# Patient Record
Sex: MALE | Race: WHITE | Employment: FULL TIME | URBAN - METROPOLITAN AREA
[De-identification: names, ages, dates, MRNs, and addresses within clinical notes are randomized per-mention and may not be internally consistent; named-entity substitution may affect disease eponyms.]

---

## 2019-01-07 ENCOUNTER — OFFICE VISIT (OUTPATIENT)
Dept: PRIMARY CARE CLINIC | Age: 44
End: 2019-01-07
Payer: COMMERCIAL

## 2019-01-07 VITALS
HEART RATE: 100 BPM | DIASTOLIC BLOOD PRESSURE: 92 MMHG | WEIGHT: 234.6 LBS | SYSTOLIC BLOOD PRESSURE: 136 MMHG | OXYGEN SATURATION: 98 % | BODY MASS INDEX: 31.82 KG/M2

## 2019-01-07 DIAGNOSIS — F41.9 ANXIETY: Primary | ICD-10-CM

## 2019-01-07 DIAGNOSIS — F51.01 PRIMARY INSOMNIA: ICD-10-CM

## 2019-01-07 PROCEDURE — 99213 OFFICE O/P EST LOW 20 MIN: CPT | Performed by: FAMILY MEDICINE

## 2019-01-07 RX ORDER — MELATONIN 10 MG
10 CAPSULE ORAL NIGHTLY
COMMUNITY
Start: 2019-01-07 | End: 2021-01-25

## 2019-01-07 RX ORDER — CITALOPRAM 20 MG/1
20 TABLET ORAL DAILY
Qty: 30 TABLET | Refills: 1 | Status: SHIPPED | OUTPATIENT
Start: 2019-01-07 | End: 2019-02-11 | Stop reason: SDUPTHER

## 2019-02-11 ENCOUNTER — OFFICE VISIT (OUTPATIENT)
Dept: PRIMARY CARE CLINIC | Age: 44
End: 2019-02-11
Payer: COMMERCIAL

## 2019-02-11 VITALS
HEART RATE: 68 BPM | BODY MASS INDEX: 31.03 KG/M2 | WEIGHT: 228.8 LBS | SYSTOLIC BLOOD PRESSURE: 122 MMHG | OXYGEN SATURATION: 96 % | DIASTOLIC BLOOD PRESSURE: 80 MMHG

## 2019-02-11 DIAGNOSIS — Z79.899 MEDICATION MANAGEMENT: Primary | ICD-10-CM

## 2019-02-11 DIAGNOSIS — F41.9 ANXIETY: ICD-10-CM

## 2019-02-11 PROCEDURE — 99213 OFFICE O/P EST LOW 20 MIN: CPT | Performed by: FAMILY MEDICINE

## 2019-02-11 RX ORDER — CITALOPRAM 20 MG/1
20 TABLET ORAL DAILY
Qty: 30 TABLET | Refills: 5 | Status: SHIPPED | OUTPATIENT
Start: 2019-02-11 | End: 2019-08-30 | Stop reason: SDUPTHER

## 2019-02-11 ASSESSMENT — ENCOUNTER SYMPTOMS
NAUSEA: 1
VOMITING: 0
SHORTNESS OF BREATH: 0
CHEST TIGHTNESS: 0
SORE THROAT: 0
BLOOD IN STOOL: 0
DIARRHEA: 0
ABDOMINAL PAIN: 0
COUGH: 0
CONSTIPATION: 0

## 2019-02-11 ASSESSMENT — PATIENT HEALTH QUESTIONNAIRE - PHQ9
SUM OF ALL RESPONSES TO PHQ QUESTIONS 1-9: 0
2. FEELING DOWN, DEPRESSED OR HOPELESS: 0
1. LITTLE INTEREST OR PLEASURE IN DOING THINGS: 0
SUM OF ALL RESPONSES TO PHQ QUESTIONS 1-9: 0
SUM OF ALL RESPONSES TO PHQ9 QUESTIONS 1 & 2: 0

## 2019-08-30 DIAGNOSIS — F41.9 ANXIETY: ICD-10-CM

## 2019-09-03 RX ORDER — CITALOPRAM 20 MG/1
TABLET ORAL
Qty: 30 TABLET | Refills: 5 | Status: SHIPPED | OUTPATIENT
Start: 2019-09-03 | End: 2020-03-26

## 2020-04-28 RX ORDER — CITALOPRAM 20 MG/1
TABLET ORAL
Qty: 90 TABLET | Refills: 0 | OUTPATIENT
Start: 2020-04-28

## 2020-05-01 ENCOUNTER — TELEMEDICINE (OUTPATIENT)
Dept: PRIMARY CARE CLINIC | Age: 45
End: 2020-05-01
Payer: COMMERCIAL

## 2020-05-01 VITALS — HEIGHT: 72 IN | TEMPERATURE: 97.4 F | WEIGHT: 240 LBS | BODY MASS INDEX: 32.51 KG/M2

## 2020-05-01 PROCEDURE — 99213 OFFICE O/P EST LOW 20 MIN: CPT | Performed by: FAMILY MEDICINE

## 2020-05-01 RX ORDER — CITALOPRAM 20 MG/1
20 TABLET ORAL DAILY
Qty: 90 TABLET | Refills: 2 | Status: SHIPPED | OUTPATIENT
Start: 2020-05-01 | End: 2020-11-20 | Stop reason: SDUPTHER

## 2020-05-01 ASSESSMENT — PATIENT HEALTH QUESTIONNAIRE - PHQ9
1. LITTLE INTEREST OR PLEASURE IN DOING THINGS: 0
SUM OF ALL RESPONSES TO PHQ9 QUESTIONS 1 & 2: 0
SUM OF ALL RESPONSES TO PHQ QUESTIONS 1-9: 0
SUM OF ALL RESPONSES TO PHQ QUESTIONS 1-9: 0
2. FEELING DOWN, DEPRESSED OR HOPELESS: 0

## 2020-05-01 ASSESSMENT — ENCOUNTER SYMPTOMS: RESPIRATORY NEGATIVE: 1

## 2020-05-01 NOTE — PROGRESS NOTES
Lee Ann Desir a 53 y.o. male who presents today for his medical conditions/complaints as notedbelow. Chief Complaint   Patient presents with    Medication Check     PATIENT TAKES CELEXA, PATIENT SAID MEDICATION IS WORKING     Other     PT CONSENTS TO TELEHEALTH SERVICES          HPI:   HPI  Anxiety: under good control. Feels well  sometimes trouble sleeping with new job about 7 weeks  Uses melatonin 2 x a week. Had a work physical 7 weeks ago: BP was good. No results found for: LDLCHOLESTEROL, LDLCALC    (goal LDL is <100)   No results found for: AST, ALT, BUN  BP Readings from Last 3 Encounters:   02/11/19 122/80   01/07/19 (!) 136/92   06/21/18 (!) 134/100          (goal 120/80)    No past medical history on file. No past surgical history on file. No family history on file. Social History     Tobacco Use    Smoking status: Never Smoker    Smokeless tobacco: Never Used   Substance Use Topics    Alcohol use: Not on file      Current Outpatient Medications   Medication Sig Dispense Refill    citalopram (CELEXA) 20 MG tablet Take 1 tablet by mouth daily 90 tablet 2    Melatonin 10 MG CAPS Take 10 mg by mouth nightly (Patient taking differently: Take 10 mg by mouth nightly as needed )       No current facility-administered medications for this visit. Allergies   Allergen Reactions    Bactrim [Sulfamethoxazole-Trimethoprim] Itching and Swelling       Health Maintenance   Topic Date Due    HIV screen  05/25/1988    Lipid screen  05/25/2013    Diabetes screen  05/25/2013    Flu vaccine (Season Ended) 09/01/2020    DTaP/Tdap/Td vaccine (2 - Td) 03/22/2026    Hepatitis A vaccine  Aged Out    Hepatitis B vaccine  Aged Out    Hib vaccine  Aged Out    Meningococcal (ACWY) vaccine  Aged Out    Pneumococcal 0-64 years Vaccine  Aged Out       Subjective:      Review of Systems   Respiratory: Negative. Cardiovascular: Negative.     Psychiatric/Behavioral: Positive for sleep disturbance. The patient is not nervous/anxious. Objective:     Temp 97.4 °F (36.3 °C)   Ht 6' (1.829 m)   Wt 240 lb (108.9 kg)   BMI 32.55 kg/m²   Physical Exam  Vitals signs and nursing note reviewed. Constitutional:       General: He is not in acute distress. Appearance: Normal appearance. He is not ill-appearing. HENT:      Head: Normocephalic and atraumatic. Neurological:      Mental Status: He is alert and oriented to person, place, and time. Psychiatric:         Mood and Affect: Mood normal.         Behavior: Behavior normal.         Thought Content: Thought content normal.         Assessment:       Diagnosis Orders   1. Medication management     2. Anxiety  citalopram (CELEXA) 20 MG tablet   3. Screening cholesterol level  Lipid Panel   4. Diabetes mellitus screening  Glucose, Fasting        Plan:      Return in about 10 months (around 3/1/2021) for anxiety. Can use melatonin for sleep up to 10 mg every 2-3 days, can also try benadryl for sleep. Continue meds  Check cholesterol. Recheck on anxiety 8-12 months. Orders Placed This Encounter   Procedures    Lipid Panel     Standing Status:   Future     Standing Expiration Date:   5/1/2021     Order Specific Question:   Is Patient Fasting?/# of Hours     Answer:   yes    Glucose, Fasting     Standing Status:   Future     Standing Expiration Date:   11/1/2020     Orders Placed This Encounter   Medications    citalopram (CELEXA) 20 MG tablet     Sig: Take 1 tablet by mouth daily     Dispense:  90 tablet     Refill:  2       Patient given educational materials - see patient instructions. Discussed use, benefit, and side effects of prescribed medications. All patient questions answered. Pt voiced understanding. Reviewed health maintenance. Instructed to continue current medications. Patient agreed with treatment plan. Follow up as directed.      Steven Gonzalez is a 53 y.o. male being evaluated by a Virtual Visit (video visit)

## 2020-05-01 NOTE — PATIENT INSTRUCTIONS

## 2020-11-20 RX ORDER — CITALOPRAM 20 MG/1
20 TABLET ORAL DAILY
Qty: 90 TABLET | Refills: 2 | Status: SHIPPED | OUTPATIENT
Start: 2020-11-20 | End: 2021-01-25 | Stop reason: SDUPTHER

## 2021-01-25 ENCOUNTER — OFFICE VISIT (OUTPATIENT)
Dept: PRIMARY CARE CLINIC | Age: 46
End: 2021-01-25
Payer: COMMERCIAL

## 2021-01-25 VITALS
BODY MASS INDEX: 33.09 KG/M2 | OXYGEN SATURATION: 97 % | WEIGHT: 244 LBS | SYSTOLIC BLOOD PRESSURE: 118 MMHG | HEART RATE: 95 BPM | TEMPERATURE: 97.9 F | DIASTOLIC BLOOD PRESSURE: 78 MMHG

## 2021-01-25 DIAGNOSIS — F41.0 PANIC ATTACKS: ICD-10-CM

## 2021-01-25 DIAGNOSIS — F41.9 ANXIETY AND DEPRESSION: Primary | ICD-10-CM

## 2021-01-25 DIAGNOSIS — F32.A ANXIETY AND DEPRESSION: Primary | ICD-10-CM

## 2021-01-25 DIAGNOSIS — Z23 NEED FOR VACCINATION: ICD-10-CM

## 2021-01-25 DIAGNOSIS — Z13.31 POSITIVE DEPRESSION SCREENING: ICD-10-CM

## 2021-01-25 PROCEDURE — 90686 IIV4 VACC NO PRSV 0.5 ML IM: CPT | Performed by: PHYSICIAN ASSISTANT

## 2021-01-25 PROCEDURE — G8431 POS CLIN DEPRES SCRN F/U DOC: HCPCS | Performed by: PHYSICIAN ASSISTANT

## 2021-01-25 PROCEDURE — 90471 IMMUNIZATION ADMIN: CPT | Performed by: PHYSICIAN ASSISTANT

## 2021-01-25 PROCEDURE — 99213 OFFICE O/P EST LOW 20 MIN: CPT | Performed by: PHYSICIAN ASSISTANT

## 2021-01-25 RX ORDER — CITALOPRAM 40 MG/1
40 TABLET ORAL DAILY
Qty: 30 TABLET | Refills: 0 | Status: SHIPPED | OUTPATIENT
Start: 2021-01-25 | End: 2021-01-25 | Stop reason: SDUPTHER

## 2021-01-25 RX ORDER — CITALOPRAM 40 MG/1
40 TABLET ORAL DAILY
Qty: 90 TABLET | Refills: 0 | Status: SHIPPED | OUTPATIENT
Start: 2021-01-25 | End: 2021-05-03

## 2021-01-25 SDOH — ECONOMIC STABILITY: INCOME INSECURITY: HOW HARD IS IT FOR YOU TO PAY FOR THE VERY BASICS LIKE FOOD, HOUSING, MEDICAL CARE, AND HEATING?: NOT HARD AT ALL

## 2021-01-25 SDOH — ECONOMIC STABILITY: TRANSPORTATION INSECURITY
IN THE PAST 12 MONTHS, HAS THE LACK OF TRANSPORTATION KEPT YOU FROM MEDICAL APPOINTMENTS OR FROM GETTING MEDICATIONS?: NO

## 2021-01-25 SDOH — ECONOMIC STABILITY: TRANSPORTATION INSECURITY
IN THE PAST 12 MONTHS, HAS LACK OF TRANSPORTATION KEPT YOU FROM MEETINGS, WORK, OR FROM GETTING THINGS NEEDED FOR DAILY LIVING?: NO

## 2021-01-25 ASSESSMENT — PATIENT HEALTH QUESTIONNAIRE - PHQ9
4. FEELING TIRED OR HAVING LITTLE ENERGY: 2
5. POOR APPETITE OR OVEREATING: 2
SUM OF ALL RESPONSES TO PHQ QUESTIONS 1-9: 18
7. TROUBLE CONCENTRATING ON THINGS, SUCH AS READING THE NEWSPAPER OR WATCHING TELEVISION: 1
SUM OF ALL RESPONSES TO PHQ QUESTIONS 1-9: 18
SUM OF ALL RESPONSES TO PHQ9 QUESTIONS 1 & 2: 6
SUM OF ALL RESPONSES TO PHQ QUESTIONS 1-9: 18

## 2021-01-25 ASSESSMENT — COLUMBIA-SUICIDE SEVERITY RATING SCALE - C-SSRS: 2. HAVE YOU ACTUALLY HAD ANY THOUGHTS OF KILLING YOURSELF?: NO

## 2021-01-25 NOTE — PROGRESS NOTES
[facility]  [address] [facility] [address]  Dept: Grecia Kessler is a 50 y.o. male Established patient, who presents today for his medical conditions/complaintsas noted below. Chief Complaint   Patient presents with    Panic Attack     pt states it is triggered by work. HPI:     HPI     Anxiety and depression:  Worse the last 2.5 weeks. Works at [employer] as an . 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 His instructor is not kind and calls him an idiot. He can't stop thinking about the CDL training. He had a surprise birthday party for his son this week and couldn't enjoy it. He will have to retest for the CDL license at the end of Feb, so he cannot relax even though he is on vacation next week in [place]. Says he can't eat, can' t sleep. Melatonin only helps him sleep until 3 AM.  It is causing stomach pain that also builds up behind the sternum, but says she knows it is not cardiac CP because it resolved when he was venting to a friend who also has anxiety issues. Was getting panic attacks every couple days during the two weeks he was doing the CDL training and frequent crying spells. Says he was less anxious today because he got to do his normal job. C/o irritability, decreased concentration, less patience, feeling jumpy when his puppy barks. Reviewed prior notes None  Reviewed previous None    No results found for: LDLCHOLESTEROL, LDLCALC    (goal LDL is <100)   No results found for: AST, ALT, BUN, LABA1C, TSH  BP Readings from Last 3 Encounters:   01/25/21 118/78   02/11/19 122/80   01/07/19 (!) 136/92          (goal 120/80)    No past medical history on file. No past surgical history on file. No family history on file.     Social History     Tobacco Use    Smoking status: Never Smoker    Smokeless tobacco: Never Used Substance Use Topics    Alcohol use: Not on file      Current Outpatient Medications   Medication Sig Dispense Refill    citalopram (CELEXA) 40 MG tablet Take 1 tablet by mouth daily 90 tablet 0     No current facility-administered medications for this visit. Allergies   Allergen Reactions    Bactrim [Sulfamethoxazole-Trimethoprim] Itching and Swelling       Health Maintenance   Topic Date Due    Hepatitis C screen  1973    HIV screen  05/25/1988    Lipid screen  05/25/2013    Diabetes screen  05/25/2013    DTaP/Tdap/Td vaccine (2 - Td) 03/22/2026    Flu vaccine  Completed    Hepatitis A vaccine  Aged Out    Hepatitis B vaccine  Aged Out    Hib vaccine  Aged Out    Meningococcal (ACWY) vaccine  Aged Out    Pneumococcal 0-64 years Vaccine  Aged Out       Subjective:      Review of Systems   Gastrointestinal: Positive for abdominal pain and diarrhea (when anxious). Psychiatric/Behavioral: Positive for dysphoric mood and sleep disturbance. The patient is nervous/anxious. Objective:     /78   Pulse 95   Temp 97.9 °F (36.6 °C)   Wt 244 lb (110.7 kg)   SpO2 97%   BMI 33.09 kg/m²   Physical Exam  Vitals signs and nursing note reviewed. Constitutional:       Appearance: Normal appearance. HENT:      Head: Normocephalic and atraumatic. Cardiovascular:      Rate and Rhythm: Normal rate and regular rhythm. Pulmonary:      Effort: Pulmonary effort is normal.      Breath sounds: Normal breath sounds. Abdominal:      General: Bowel sounds are normal. There is no distension. Palpations: Abdomen is soft. Tenderness: There is abdominal tenderness in the epigastric area. There is no guarding or rebound. Neurological:      Mental Status: He is alert. Psychiatric:         Mood and Affect: Mood is anxious. Assessment:       Diagnosis Orders   1.  Anxiety and depression  External Referral To Psychology    citalopram (CELEXA) 40 MG tablet DISCONTINUED: citalopram (CELEXA) 40 MG tablet   2. Panic attacks  External Referral To Psychology    citalopram (CELEXA) 40 MG tablet    DISCONTINUED: citalopram (CELEXA) 40 MG tablet   3. Positive depression screening  Positive Screen for Clinical Depression with a Documented Follow-up Plan    4. Need for vaccination  INFLUENZA, QUADV, 3 YRS AND OLDER, IM PF, PREFILL SYR OR SDV, 0.5ML (AFLURIA QUADV, PF)        Plan:     1-3 Anxiety and depression (Chronic with exacerbation)   -Try increasing Celexa from 20 mg to 40 mg. If pt is still getting severe anxiety after increase, could consider adding buspar.   -Referral to psychology for counseling.   - I also suggested trying pepcid for his stomach pain, but pt said it is just his anxiety and declined a medication for it. 4. Given influenza vaccine. Reprinted previous orders for labs. Return in about 1 month (around 2/25/2021) for anxiety f/u with Dr. Teodoro Chapman.     Orders Placed This Encounter   Procedures    INFLUENZA, QUADV, 3 YRS AND OLDER, IM PF, PREFILL SYR OR SDV, 0.5ML (AFLURIA QUADV, PF)    External Referral To Psychology     Referral Priority:   Routine     Referral Type:   Eval and Treat     Referral Reason:   Specialty Services Required     Requested Specialty:   Psychology     Number of Visits Requested:   1    Positive Screen for Clinical Depression with a Documented Follow-up Plan      Orders Placed This Encounter   Medications    DISCONTD: citalopram (CELEXA) 40 MG tablet     Sig: Take 1 tablet by mouth daily     Dispense:  30 tablet     Refill:  0    citalopram (CELEXA) 40 MG tablet     Sig: Take 1 tablet by mouth daily     Dispense:  90 tablet     Refill:  0 Patient given educationalmaterials - see patient instructions. Discussed use, benefit, and side effectsof prescribed medications. All patient questions answered. Pt voiced understanding. Reviewed health maintenance. Instructed to continue current medications, diet andexercise. Patient agreed with treatment plan. Follow up as directed.      Electronicallysigned by Emily Narvaez PA-C on 1/27/2021 at 11:08 AM

## 2021-01-27 PROBLEM — F41.0 PANIC ATTACKS: Status: ACTIVE | Noted: 2021-01-27

## 2021-01-27 ASSESSMENT — ENCOUNTER SYMPTOMS
DIARRHEA: 1
ABDOMINAL PAIN: 1

## 2021-02-26 ENCOUNTER — OFFICE VISIT (OUTPATIENT)
Dept: PRIMARY CARE CLINIC | Age: 46
End: 2021-02-26
Payer: COMMERCIAL

## 2021-02-26 VITALS
HEART RATE: 81 BPM | SYSTOLIC BLOOD PRESSURE: 122 MMHG | HEIGHT: 72 IN | BODY MASS INDEX: 33.92 KG/M2 | WEIGHT: 250.4 LBS | DIASTOLIC BLOOD PRESSURE: 80 MMHG | TEMPERATURE: 97.9 F | OXYGEN SATURATION: 97 %

## 2021-02-26 DIAGNOSIS — F32.A ANXIETY AND DEPRESSION: ICD-10-CM

## 2021-02-26 DIAGNOSIS — F41.9 ANXIETY AND DEPRESSION: ICD-10-CM

## 2021-02-26 DIAGNOSIS — Z79.899 MEDICATION MANAGEMENT: Primary | ICD-10-CM

## 2021-02-26 DIAGNOSIS — F41.9 ANXIETY: ICD-10-CM

## 2021-02-26 PROCEDURE — 99213 OFFICE O/P EST LOW 20 MIN: CPT | Performed by: FAMILY MEDICINE

## 2021-02-26 NOTE — PATIENT INSTRUCTIONS
Patient Education        Anxiety Disorder: Care Instructions  Your Care Instructions     Anxiety is a normal reaction to stress. Difficult situations can cause you to have symptoms such as sweaty palms and a nervous feeling. In an anxiety disorder, the symptoms are far more severe. Constant worry, muscle tension, trouble sleeping, nausea and diarrhea, and other symptoms can make normal daily activities difficult or impossible. These symptoms may occur for no reason, and they can affect your work, school, or social life. Medicines, counseling, and self-care can all help. Follow-up care is a key part of your treatment and safety. Be sure to make and go to all appointments, and call your doctor if you are having problems. It's also a good idea to know your test results and keep a list of the medicines you take. How can you care for yourself at home? · Take medicines exactly as directed. Call your doctor if you think you are having a problem with your medicine. · Go to your counseling sessions and follow-up appointments. · Recognize and accept your anxiety. Then, when you are in a situation that makes you anxious, say to yourself, \"This is not an emergency. I feel uncomfortable, but I am not in danger. I can keep going even if I feel anxious. \"  · Be kind to your body:  ? Relieve tension with exercise or a massage. ? Get enough rest.  ? Avoid alcohol, caffeine, nicotine, and illegal drugs. They can increase your anxiety level and cause sleep problems. ? Learn and do relaxation techniques. See below for more about these techniques. · Engage your mind. Get out and do something you enjoy. Go to a funny movie, or take a walk or hike. Plan your day. Having too much or too little to do can make you anxious. · Keep a record of your symptoms. Discuss your fears with a good friend or family member, or join a support group for people with similar problems. Talking to others sometimes relieves stress. · Get involved in social groups, or volunteer to help others. Being alone sometimes makes things seem worse than they are. · Get at least 30 minutes of exercise on most days of the week to relieve stress. Walking is a good choice. You also may want to do other activities, such as running, swimming, cycling, or playing tennis or team sports. Relaxation techniques  Do relaxation exercises 10 to 20 minutes a day. You can play soothing, relaxing music while you do them, if you wish. · Tell others in your house that you are going to do your relaxation exercises. Ask them not to disturb you. · Find a comfortable place, away from all distractions and noise. · Lie down on your back, or sit with your back straight. · Focus on your breathing. Make it slow and steady. · Breathe in through your nose. Breathe out through either your nose or mouth. · Breathe deeply, filling up the area between your navel and your rib cage. Breathe so that your belly goes up and down. · Do not hold your breath. · Breathe like this for 5 to 10 minutes. Notice the feeling of calmness throughout your whole body. As you continue to breathe slowly and deeply, relax by doing the following for another 5 to 10 minutes:  · Tighten and relax each muscle group in your body. You can begin at your toes and work your way up to your head. · Imagine your muscle groups relaxing and becoming heavy. · Empty your mind of all thoughts. · Let yourself relax more and more deeply. · Become aware of the state of calmness that surrounds you. · When your relaxation time is over, you can bring yourself back to alertness by moving your fingers and toes and then your hands and feet and then stretching and moving your entire body. Sometimes people fall asleep during relaxation, but they usually wake up shortly afterward. · Always give yourself time to return to full alertness before you drive a car or do anything that might cause an accident if you are not fully alert. Never play a relaxation tape while you drive a car. When should you call for help? Call 911 anytime you think you may need emergency care. For example, call if:    · You feel you cannot stop from hurting yourself or someone else. Keep the numbers for these national suicide hotlines: 2-388-078-TALK (7-256.967.2546) and 0-013-IDUWSPQ (0-308.959.1516). If you or someone you know talks about suicide or feeling hopeless, get help right away. Watch closely for changes in your health, and be sure to contact your doctor if:    · You have anxiety or fear that affects your life.     · You have symptoms of anxiety that are new or different from those you had before. Where can you learn more? Go to https://[email or web address].Pursuit Management. org and sign in to your [employer] account. Enter P754 in the [employer] box to learn more about \"Anxiety Disorder: Care Instructions. \"     If you do not have an account, please click on the \"Sign Up Now\" link. Current as of: January 31, 2020               Content Version: 12.6  © 2006-2020 [employer]. Care instructions adapted under license by [facility] ([facility]). If you have questions about a medical condition or this instruction, always ask your healthcare professional. [place] [address] any warranty or liability for your use of this information. Patient Education        Learning About Anxiety Disorders  What are anxiety disorders? Anxiety disorders are a type of medical problem. They cause severe anxiety. When you feel anxious, you feel that something bad is about to happen. This feeling interferes with your life.   These disorders include: · Generalized anxiety disorder. You feel worried and stressed about many everyday events and activities. This goes on for several months and disrupts your life on most days. · Panic disorder. You have repeated panic attacks. A panic attack is a sudden, intense fear or anxiety. It may make you feel short of breath. Your heart may pound. · Social anxiety disorder. You feel very anxious about what you will say or do in front of people. For example, you may be scared to talk or eat in public. This problem affects your daily life. · Phobias. You are very scared of a specific object, situation, or activity. For example, you may fear spiders, high places, or small spaces. What are the symptoms? Generalized anxiety disorder  Symptoms may include:  · Feeling worried and stressed about many things almost every day. · Feeling tired or irritable. You may have a hard time concentrating. · Having headaches or muscle aches. · Having a hard time getting to sleep or staying asleep. Panic disorder  You may have repeated panic attacks when there is no reason for feeling afraid. You may change your daily activities because you worry that you will have another attack. Symptoms may include:  · Intense fear, terror, or anxiety. · Trouble breathing or very fast breathing. · Chest pain or tightness. · A heartbeat that races or is not regular. Social anxiety disorder  Symptoms may include:  · Fear about a social situation, such as eating in front of others or speaking in public. You may worry a lot. Or you may be afraid that something bad will happen. · Anxiety that can cause you to blush, sweat, and feel shaky. · A heartbeat that is faster than normal.  · A hard time focusing. Phobias  Symptoms may include:  · More fear than most people of being around an object, being in a situation, or doing an activity. You might also be stressed about the chance of being around the thing you fear. · Worry about losing control, panicking, fainting, or having physical symptoms like a faster heartbeat when you are around the situation or object. How are these disorders treated? Anxiety disorders can be treated with medicines or counseling. A combination of both may be used. Medicines may include:  · Antidepressants. These may help your symptoms by keeping chemicals in your brain in balance. · Benzodiazepines. These may give you short-term relief of your symptoms. Some people use cognitive-behavioral therapy. A therapist helps you learn to change stressful or bad thoughts into helpful thoughts. Lead a healthy lifestyle  A healthy lifestyle may help you feel better. · Get at least 30 minutes of exercise on most days of the week. Walking is a good choice. · Eat a healthy diet. Include fruits, vegetables, lean proteins, and whole grains in your diet each day. · Try to go to bed at the same time every night. Try for 8 hours of sleep a night. · Find ways to manage stress. Try relaxation exercises. · Avoid alcohol and illegal drugs. Follow-up care is a key part of your treatment and safety. Be sure to make and go to all appointments, and call your doctor if you are having problems. It's also a good idea to know your test results and keep a list of the medicines you take. Where can you learn more? Go to https://Thoundsbryceeb.Fiz. org and sign in to your [employer] account. Enter I393 in the [facility] box to learn more about \"Learning About Anxiety Disorders. \"     If you do not have an account, please click on the \"Sign Up Now\" link. Current as of: January 31, 2020               Content Version: 12.6  © 0901-3784 [employer]. Care instructions adapted under license by [facility] ([facility]). If you have questions about a medical condition or this instruction, always ask your healthcare professional. [address] any warranty or liability for your use of this information.

## 2021-02-26 NOTE — PROGRESS NOTES
[facility]  [address]  [facility] [address]  Dept: Grecia Kessler is a 50 y.o. male Established patient, who presents today for his medical conditions/complaintsas noted below. Chief Complaint   Patient presents with    Anxiety     1mth f/u       HPI:     HPI    Feeling more calm and in control  He is planning on seeing counselor  He is sleeping better  Not feeling upset. No chest pain, no palpitations. Nausea is gone    Reviewed prior notes None  Reviewed previous none    No results found for: LDLCHOLESTEROL, LDLCALC    (goal LDL is <100)   No results found for: AST, ALT, BUN, LABA1C, TSH  BP Readings from Last 3 Encounters:   02/26/21 122/80   01/25/21 118/78   02/11/19 122/80          (goal 120/80)    No past medical history on file. No past surgical history on file. No family history on file. Social History     Tobacco Use    Smoking status: Never Smoker    Smokeless tobacco: Never Used   Substance Use Topics    Alcohol use: Not on file      Current Outpatient Medications   Medication Sig Dispense Refill    citalopram (CELEXA) 40 MG tablet Take 1 tablet by mouth daily 90 tablet 0     No current facility-administered medications for this visit. Allergies   Allergen Reactions    Bactrim [Sulfamethoxazole-Trimethoprim] Itching and Swelling       Health Maintenance   Topic Date Due    Hepatitis C screen  1973    HIV screen  05/25/1988    Lipid screen  05/25/2013    Diabetes screen  05/25/2013    DTaP/Tdap/Td vaccine (2 - Td) 03/22/2026    Flu vaccine  Completed    Hepatitis A vaccine  Aged Out    Hepatitis B vaccine  Aged Out    Hib vaccine  Aged Out    Meningococcal (ACWY) vaccine  Aged Out    Pneumococcal 0-64 years Vaccine  Aged Out       Subjective:      Review of Systems   Constitutional: Negative.         Objective: /80   Pulse 81   Temp 97.9 °F (36.6 °C)   Ht 6' (1.829 m)   Wt 250 lb 6.4 oz (113.6 kg)   SpO2 97%   BMI 33.96 kg/m²   Physical Exam  Vitals signs and nursing note reviewed. Constitutional:       Appearance: Normal appearance. HENT:      Head: Normocephalic and atraumatic. Neurological:      Mental Status: He is alert and oriented to person, place, and time. Psychiatric:         Mood and Affect: Mood normal.         Behavior: Behavior normal.         Thought Content: Thought content normal.         Assessment:       Diagnosis Orders   1. Medication management     2. Anxiety     3. Anxiety and depression          Plan:      Return in about 4 months (around 6/26/2021) for anxiety. No orders of the defined types were placed in this encounter. No orders of the defined types were placed in this encounter. Patient given educationalmaterials - see patient instructions. Discussed use, benefit, and side effectsof prescribed medications. All patient questions answered. Pt voiced understanding. Reviewed health maintenance. Instructed to continue current medications, diet andexercise. Patient agreed with treatment plan. Follow up as directed.      Electronicallysigned by Cuco Jesus MD on 2/26/2021 at 2:05 PM

## 2021-03-01 RX ORDER — CITALOPRAM 40 MG/1
40 TABLET ORAL DAILY
Qty: 90 TABLET | Refills: 1 | Status: SHIPPED | OUTPATIENT
Start: 2021-03-01 | End: 2021-05-03 | Stop reason: SDUPTHER

## 2021-07-02 ENCOUNTER — OFFICE VISIT (OUTPATIENT)
Dept: PRIMARY CARE CLINIC | Age: 46
End: 2021-07-02
Payer: COMMERCIAL

## 2021-07-02 VITALS — SYSTOLIC BLOOD PRESSURE: 120 MMHG | HEART RATE: 60 BPM | DIASTOLIC BLOOD PRESSURE: 80 MMHG

## 2021-07-02 DIAGNOSIS — Z13.220 SCREENING CHOLESTEROL LEVEL: ICD-10-CM

## 2021-07-02 DIAGNOSIS — F41.9 ANXIETY AND DEPRESSION: ICD-10-CM

## 2021-07-02 DIAGNOSIS — Z13.1 DIABETES MELLITUS SCREENING: ICD-10-CM

## 2021-07-02 DIAGNOSIS — Z79.899 MEDICATION MANAGEMENT: Primary | ICD-10-CM

## 2021-07-02 DIAGNOSIS — F32.A ANXIETY AND DEPRESSION: ICD-10-CM

## 2021-07-02 PROCEDURE — 99213 OFFICE O/P EST LOW 20 MIN: CPT | Performed by: FAMILY MEDICINE

## 2021-07-02 NOTE — PROGRESS NOTES
[facility] PRIMARY CARE  [address]  [facility] [address]  Dept: Grecia Kessler is a 48 y.o. male Established patient, who presents today for his medical conditions/complaintsas noted below. Chief Complaint   Patient presents with    Follow-up     anxiety       HPI:     HPI    Reviewed prior notes None  Reviewed previous Labsnone  Anxiety is under control, he feels well. Having to take tests at work. No results found for: LDLCHOLESTEROL, LDLCALC    (goal LDL is <100)   No results found for: AST, ALT, BUN, LABA1C, TSH  BP Readings from Last 3 Encounters:   07/02/21 120/80   02/26/21 122/80   01/25/21 118/78          (goal 120/80)    No past medical history on file. No past surgical history on file. No family history on file. Social History     Tobacco Use    Smoking status: Never Smoker    Smokeless tobacco: Never Used   Substance Use Topics    Alcohol use: Not on file      Current Outpatient Medications   Medication Sig Dispense Refill    citalopram (CELEXA) 40 MG tablet TAKE 1 TABLET DAILY 90 tablet 1     No current facility-administered medications for this visit. Allergies   Allergen Reactions    Bactrim [Sulfamethoxazole-Trimethoprim] Itching and Swelling       Health Maintenance   Topic Date Due    Hepatitis C screen  Never done    HIV screen  Never done    Lipid screen  Never done    Diabetes screen  Never done    Flu vaccine (1) 09/01/2021    DTaP/Tdap/Td vaccine (2 - Td or Tdap) 03/22/2026    COVID-19 Vaccine  Completed    Hepatitis A vaccine  Aged Out    Hepatitis B vaccine  Aged Out    Hib vaccine  Aged Out    Meningococcal (ACWY) vaccine  Aged Out    Pneumococcal 0-64 years Vaccine  Aged Out       Subjective:      Review of Systems   Constitutional: Negative.     Genitourinary:        Bladder pressure issues off and on.        Objective:     /80 (Site: Left Upper Arm, Position: Sitting, Cuff Size: Large Adult)   Pulse 60   Physical Exam  Vitals and nursing note reviewed. Constitutional:       General: He is not in acute distress. Appearance: He is well-developed. He is not ill-appearing. HENT:      Head: Normocephalic and atraumatic. Eyes:      General: No scleral icterus. Right eye: No discharge. Left eye: No discharge. Neck:      Thyroid: No thyromegaly. Trachea: No tracheal deviation. Cardiovascular:      Rate and Rhythm: Regular rhythm. Lymphadenopathy:      Cervical: No cervical adenopathy. Skin:     General: Skin is warm. Findings: No rash. Neurological:      Mental Status: He is alert and oriented to person, place, and time. Psychiatric:         Mood and Affect: Mood normal.         Behavior: Behavior normal.         Thought Content: Thought content normal.         Assessment:       Diagnosis Orders   1. Medication management     2. Anxiety and depression     3. Screening cholesterol level  Lipid Panel   4. Diabetes mellitus screening  Glucose, Fasting        Plan:      Return in about 6 months (around 1/2/2022) for anxiety. Call prn. Orders Placed This Encounter   Procedures    Glucose, Fasting     Standing Status:   Future     Standing Expiration Date:   1/2/2022    Lipid Panel     Standing Status:   Future     Standing Expiration Date:   7/2/2022     Order Specific Question:   Is Patient Fasting?/# of Hours     Answer:   yes     No orders of the defined types were placed in this encounter. Patient given educationalmaterials - see patient instructions. Discussed use, benefit, and side effectsof prescribed medications. All patient questions answered. Pt voiced understanding. Reviewed health maintenance. Instructed to continue current medications, diet andexercise. Patient agreed with treatment plan. Follow up as directed.      Electronicallysigned by Placido Mendoza MD on 7/2/2021 at 11:17 AM

## 2021-07-09 ENCOUNTER — HOSPITAL ENCOUNTER (OUTPATIENT)
Age: 46
Setting detail: SPECIMEN
Discharge: HOME | End: 2021-07-09
Payer: COMMERCIAL

## 2021-07-09 DIAGNOSIS — Z13.1 DIABETES MELLITUS SCREENING: ICD-10-CM

## 2021-07-09 DIAGNOSIS — Z13.220 SCREENING CHOLESTEROL LEVEL: ICD-10-CM

## 2021-07-09 LAB
CHOLESTEROL/HDL RATIO: 5.9
CHOLESTEROL: 194 MG/DL
GLUCOSE FASTING: 87 MG/DL (ref 70–99)
HDLC SERPL-MCNC: 33 MG/DL
LDL CHOLESTEROL: 113 MG/DL (ref 0–130)
TRIGL SERPL-MCNC: 242 MG/DL
VLDLC SERPL CALC-MCNC: ABNORMAL MG/DL (ref 1–30)

## 2021-08-23 ENCOUNTER — HOSPITAL ENCOUNTER (EMERGENCY)
Age: 46
Discharge: HOME | End: 2021-08-23
Attending: EMERGENCY MEDICINE
Payer: COMMERCIAL

## 2021-08-23 ENCOUNTER — APPOINTMENT (OUTPATIENT)
Dept: CT IMAGING | Age: 46
End: 2021-08-23
Payer: COMMERCIAL

## 2021-08-23 VITALS
RESPIRATION RATE: 21 BRPM | DIASTOLIC BLOOD PRESSURE: 90 MMHG | HEIGHT: 72 IN | SYSTOLIC BLOOD PRESSURE: 132 MMHG | HEART RATE: 79 BPM | WEIGHT: 250 LBS | BODY MASS INDEX: 33.86 KG/M2 | TEMPERATURE: 97.5 F | OXYGEN SATURATION: 100 %

## 2021-08-23 DIAGNOSIS — N20.1 URETERAL CALCULUS, LEFT: Primary | ICD-10-CM

## 2021-08-23 LAB
-: ABNORMAL
ABSOLUTE EOS #: 0 K/UL (ref 0–0.4)
ABSOLUTE IMMATURE GRANULOCYTE: ABNORMAL K/UL (ref 0–0.3)
ABSOLUTE LYMPH #: 1.2 K/UL (ref 1–4.8)
ABSOLUTE MONO #: 1 K/UL (ref 0.1–1.3)
ALBUMIN SERPL-MCNC: 4.9 G/DL (ref 3.5–5.2)
ALBUMIN/GLOBULIN RATIO: ABNORMAL (ref 1–2.5)
ALP BLD-CCNC: 73 U/L (ref 40–129)
ALT SERPL-CCNC: 30 U/L (ref 5–41)
AMORPHOUS: ABNORMAL
ANION GAP SERPL CALCULATED.3IONS-SCNC: 13 MMOL/L (ref 9–17)
AST SERPL-CCNC: 23 U/L
BACTERIA: ABNORMAL
BASOPHILS # BLD: 1 % (ref 0–2)
BASOPHILS ABSOLUTE: 0.1 K/UL (ref 0–0.2)
BILIRUB SERPL-MCNC: 0.55 MG/DL (ref 0.3–1.2)
BILIRUBIN URINE: NEGATIVE
BUN BLDV-MCNC: 14 MG/DL (ref 6–20)
BUN/CREAT BLD: ABNORMAL (ref 9–20)
CALCIUM SERPL-MCNC: 10.2 MG/DL (ref 8.6–10.4)
CASTS UA: ABNORMAL /LPF
CHLORIDE BLD-SCNC: 105 MMOL/L (ref 98–107)
CO2: 25 MMOL/L (ref 20–31)
COLOR: YELLOW
COMMENT UA: ABNORMAL
CREAT SERPL-MCNC: 1.44 MG/DL (ref 0.7–1.2)
CRYSTALS, UA: ABNORMAL /HPF
DIFFERENTIAL TYPE: ABNORMAL
EOSINOPHILS RELATIVE PERCENT: 0 % (ref 0–4)
EPITHELIAL CELLS UA: ABNORMAL /HPF
GFR AFRICAN AMERICAN: >60 ML/MIN
GFR NON-AFRICAN AMERICAN: 52 ML/MIN
GFR SERPL CREATININE-BSD FRML MDRD: ABNORMAL ML/MIN/{1.73_M2}
GFR SERPL CREATININE-BSD FRML MDRD: ABNORMAL ML/MIN/{1.73_M2}
GLUCOSE BLD-MCNC: 109 MG/DL (ref 70–99)
GLUCOSE URINE: NEGATIVE
HCT VFR BLD CALC: 44.6 % (ref 41–53)
HEMOGLOBIN: 14.7 G/DL (ref 13.5–17.5)
IMMATURE GRANULOCYTES: ABNORMAL %
KETONES, URINE: ABNORMAL
LEUKOCYTE ESTERASE, URINE: NEGATIVE
LIPASE: 24 U/L (ref 13–60)
LYMPHOCYTES # BLD: 10 % (ref 24–44)
MCH RBC QN AUTO: 29.3 PG (ref 26–34)
MCHC RBC AUTO-ENTMCNC: 32.9 G/DL (ref 31–37)
MCV RBC AUTO: 89.2 FL (ref 80–100)
MONOCYTES # BLD: 8 % (ref 1–7)
MUCUS: ABNORMAL
NITRITE, URINE: NEGATIVE
NRBC AUTOMATED: ABNORMAL PER 100 WBC
OTHER OBSERVATIONS UA: ABNORMAL
PDW BLD-RTO: 13.9 % (ref 11.5–14.9)
PH UA: 6 (ref 5–8)
PLATELET # BLD: 258 K/UL (ref 150–450)
PLATELET ESTIMATE: ABNORMAL
PMV BLD AUTO: 6.7 FL (ref 6–12)
POTASSIUM SERPL-SCNC: 3.7 MMOL/L (ref 3.7–5.3)
PROTEIN UA: ABNORMAL
RBC # BLD: 5 M/UL (ref 4.5–5.9)
RBC # BLD: ABNORMAL 10*6/UL
RBC UA: ABNORMAL /HPF
RENAL EPITHELIAL, UA: ABNORMAL /HPF
SEG NEUTROPHILS: 81 % (ref 36–66)
SEGMENTED NEUTROPHILS ABSOLUTE COUNT: 10.1 K/UL (ref 1.3–9.1)
SODIUM BLD-SCNC: 143 MMOL/L (ref 135–144)
SPECIFIC GRAVITY UA: 1.02 (ref 1–1.03)
TOTAL PROTEIN: 7.5 G/DL (ref 6.4–8.3)
TRICHOMONAS: ABNORMAL
TURBIDITY: CLEAR
URINE HGB: ABNORMAL
UROBILINOGEN, URINE: NORMAL
WBC # BLD: 12.4 K/UL (ref 3.5–11)
WBC # BLD: ABNORMAL 10*3/UL
WBC UA: ABNORMAL /HPF
YEAST: ABNORMAL

## 2021-08-23 PROCEDURE — 74176 CT ABD & PELVIS W/O CONTRAST: CPT

## 2021-08-23 PROCEDURE — 81001 URINALYSIS AUTO W/SCOPE: CPT

## 2021-08-23 PROCEDURE — 83690 ASSAY OF LIPASE: CPT

## 2021-08-23 PROCEDURE — 6370000000 HC RX 637 (ALT 250 FOR IP): Performed by: EMERGENCY MEDICINE

## 2021-08-23 PROCEDURE — 85025 COMPLETE CBC W/AUTO DIFF WBC: CPT

## 2021-08-23 PROCEDURE — 80053 COMPREHEN METABOLIC PANEL: CPT

## 2021-08-23 PROCEDURE — 36415 COLL VENOUS BLD VENIPUNCTURE: CPT

## 2021-08-23 PROCEDURE — 99284 EMERGENCY DEPT VISIT MOD MDM: CPT

## 2021-08-23 RX ORDER — HYDROCODONE BITARTRATE AND ACETAMINOPHEN 5; 325 MG/1; MG/1
1 TABLET ORAL EVERY 6 HOURS PRN
Qty: 20 TABLET | Refills: 0 | Status: ON HOLD | OUTPATIENT
Start: 2021-08-23 | End: 2021-08-27 | Stop reason: SDUPTHER

## 2021-08-23 RX ORDER — IBUPROFEN 800 MG/1
800 TABLET ORAL EVERY 8 HOURS PRN
Qty: 30 TABLET | Refills: 0 | Status: SHIPPED | OUTPATIENT
Start: 2021-08-23 | End: 2022-01-14 | Stop reason: ALTCHOICE

## 2021-08-23 RX ORDER — TAMSULOSIN HYDROCHLORIDE 0.4 MG/1
0.4 CAPSULE ORAL DAILY
Qty: 7 CAPSULE | Refills: 0 | Status: ON HOLD | OUTPATIENT
Start: 2021-08-23 | End: 2021-08-27 | Stop reason: SDUPTHER

## 2021-08-23 RX ORDER — CEPHALEXIN 250 MG/1
500 CAPSULE ORAL ONCE
Status: COMPLETED | OUTPATIENT
Start: 2021-08-23 | End: 2021-08-23

## 2021-08-23 RX ORDER — TAMSULOSIN HYDROCHLORIDE 0.4 MG/1
0.4 CAPSULE ORAL ONCE
Status: COMPLETED | OUTPATIENT
Start: 2021-08-23 | End: 2021-08-23

## 2021-08-23 RX ORDER — CEPHALEXIN 500 MG/1
500 CAPSULE ORAL 4 TIMES DAILY
Qty: 28 CAPSULE | Refills: 0 | Status: ON HOLD | OUTPATIENT
Start: 2021-08-23 | End: 2021-08-27 | Stop reason: SDUPTHER

## 2021-08-23 RX ADMIN — CEPHALEXIN 500 MG: 250 CAPSULE ORAL at 16:36

## 2021-08-23 RX ADMIN — TAMSULOSIN HYDROCHLORIDE 0.4 MG: 0.4 CAPSULE ORAL at 16:36

## 2021-08-23 ASSESSMENT — ENCOUNTER SYMPTOMS
BACK PAIN: 0
TROUBLE SWALLOWING: 0
SHORTNESS OF BREATH: 0
COLOR CHANGE: 0
NAUSEA: 0
VOMITING: 0
ABDOMINAL PAIN: 0
DIARRHEA: 0
COUGH: 0
CONSTIPATION: 0
SORE THROAT: 0
BLOOD IN STOOL: 0

## 2021-08-23 ASSESSMENT — PAIN DESCRIPTION - LOCATION: LOCATION: FLANK

## 2021-08-23 ASSESSMENT — PAIN SCALES - GENERAL: PAINLEVEL_OUTOF10: 9

## 2021-08-23 ASSESSMENT — PAIN DESCRIPTION - ORIENTATION: ORIENTATION: LEFT

## 2021-08-23 ASSESSMENT — PAIN DESCRIPTION - PAIN TYPE: TYPE: ACUTE PAIN

## 2021-08-23 NOTE — ED TRIAGE NOTES
Mode of arrival (squad #, walk in, police, etc) : Walk in        Chief complaint(s): Flank pain        Arrival Note (brief scenario, treatment PTA, etc). : Pt arrives to ED c/o left sided flank pain that he states has been ongoing for the last hour. Patient states that he had a similar pain once or twice earlier this month but that it went away. Patient denies any history of kidney stones. C= \"Have you ever felt that you should Cut down on your drinking? \"  No  A= \"Have people Annoyed you by criticizing your drinking? \"  No  G= \"Have you ever felt bad or Guilty about your drinking? \"  No  E= \"Have you ever had a drink as an Eye-opener first thing in the morning to steady your nerves or to help a hangover? \"  No      Deferred []      Reason for deferring: N/A    *If yes to two or more: probable alcohol abuse. *

## 2021-08-23 NOTE — ED PROVIDER NOTES
[address]  EMERGENCY DEPARTMENT ENCOUNTER    Pt Name: Shayan Ellison  MRN: [id number]  YOB: 1975  Date of evaluation:8/23/21  PCP: Verna Rinne, MD    CHIEF COMPLAINT       Chief Complaint   Patient presents with    Flank Pain       HISTORY OF PRESENT ILLNESS    Shayan Ellison is a 48 y.o. male who presents with a chief complaint of left leg pain. Patient states for the past several hours he has had intermittent pain in his left flank that radiates around his left side. States he has had 2 episodes of this in the past but it went away spontaneously. Has never been diagnosed with kidney stones. No nausea or vomiting. No changes in bowel or bladder habits. No dysuria, hematuria. Symptoms are acute. Symptoms are moderate. He describes pain as sharp. Nothing makes it better or worse. Patient has no other complaints at this time. REVIEW OF SYSTEMS       Review of Systems   Constitutional: Negative for chills, fatigue and fever. HENT: Negative for congestion, ear pain, sore throat and trouble swallowing. Eyes: Negative for visual disturbance. Respiratory: Negative for cough and shortness of breath. Cardiovascular: Negative for chest pain, palpitations and leg swelling. Gastrointestinal: Negative for abdominal pain, blood in stool, constipation, diarrhea, nausea and vomiting. Genitourinary: Positive for flank pain. Negative for dysuria. Musculoskeletal: Negative for arthralgias, back pain, myalgias and neck pain. Skin: Negative for color change, rash and wound. Neurological: Negative for dizziness, weakness, light-headedness, numbness and headaches. Psychiatric/Behavioral: Negative for confusion. All other systems reviewed and are negative. Negative in 10 essential Systems except as mentioned above and in the HPI. PAST MEDICAL HISTORY   History reviewed. No pertinent past medical history.   None    SURGICAL HISTORY      has no past surgical history on file.  None    CURRENT MEDICATIONS       Previous Medications    CITALOPRAM (CELEXA) 40 MG TABLET    TAKE 1 TABLET DAILY       ALLERGIES     is allergic to bactrim [sulfamethoxazole-trimethoprim]. FAMILY HISTORY     has no family status information on file. family history is not on file. SOCIAL HISTORY      reports that he has never smoked. He has never used smokeless tobacco.    PHYSICAL EXAM     INITIAL VITALS:  height is 6' (1.829 m) and weight is 250 lb (113.4 kg). His oral temperature is 97.5 °F (36.4 °C). His blood pressure is 132/90 (abnormal) and his pulse is 79. His respiration is 21 and oxygen saturation is 100%. Physical Exam  Vitals and nursing note reviewed. Constitutional:       General: He is not in acute distress. HENT:      Head: Normocephalic and atraumatic. Eyes:      Conjunctiva/sclera: Conjunctivae normal.      Pupils: Pupils are equal, round, and reactive to light. Cardiovascular:      Rate and Rhythm: Normal rate and regular rhythm. Heart sounds: Normal heart sounds. No murmur heard. No friction rub. No gallop. Pulmonary:      Effort: Pulmonary effort is normal. No respiratory distress. Breath sounds: Normal breath sounds. Abdominal:      General: Bowel sounds are normal. There is no distension. Palpations: Abdomen is soft. Tenderness: There is no abdominal tenderness. There is no right CVA tenderness or left CVA tenderness. Musculoskeletal:         General: No tenderness. Cervical back: Neck supple. Lymphadenopathy:      Cervical: No cervical adenopathy. Skin:     General: Skin is warm and dry. Findings: No rash. Neurological:      Mental Status: He is alert and oriented to person, place, and time. Psychiatric:         Judgment: Judgment normal.           DIFFERENTIAL DIAGNOSIS/MDM:   25-year-old male presents with left flank pain for the past several hours. He is afebrile, nontoxic, normal vital signs.   No acute distress. No abdominal tenderness on my exam.  No CVA tenderness however he states that the pain is really not present currently. With CT abdomen pelvis, urinalysis, blood work. Differential includes kidney stone, UTI, less likely pancreatitis, gastritis. DIAGNOSTIC RESULTS     EKG: All EKG's are interpreted by the Emergency Department Physician who either signs or Co-signs this chart in the absence of a cardiologist.        RADIOLOGY:   I directly visualized the following  images and reviewed the radiologist interpretations:  CT ABDOMEN PELVIS WO CONTRAST Additional Contrast? None   Final Result   1. There is a 5 x 5 x 10 mm obstructing calculus in the mid left ureter   causing mild hydronephrosis and proximal hydroureter. ED BEDSIDE ULTRASOUND:      LABS:  Labs Reviewed   URINE RT REFLEX TO CULTURE - Abnormal; Notable for the following components:       Result Value    Ketones, Urine TRACE (*)     Urine Hgb SMALL (*)     Protein, UA 1+ (*)     All other components within normal limits   MICROSCOPIC URINALYSIS - Abnormal; Notable for the following components:    Bacteria, UA FEW (*)     All other components within normal limits   CBC WITH AUTO DIFFERENTIAL - Abnormal; Notable for the following components:    WBC 12.4 (*)     Seg Neutrophils 81 (*)     Lymphocytes 10 (*)     Monocytes 8 (*)     Segs Absolute 10.10 (*)     All other components within normal limits   COMPREHENSIVE METABOLIC PANEL - Abnormal; Notable for the following components:    Glucose 109 (*)     CREATININE 1.44 (*)     GFR Non- 52 (*)     All other components within normal limits   LIPASE         EMERGENCY DEPARTMENT COURSE:   Vitals:    Vitals:    08/23/21 1324   BP: (!) 132/90   Pulse: 79   Resp: 21   Temp: 97.5 °F (36.4 °C)   TempSrc: Oral   SpO2: 100%   Weight: 250 lb (113.4 kg)   Height: 6' (1.829 m)     Patient found to have a 1 cm kidney stone in the left ureter.     He has a very mildly elevated white count. Creatinine is very mildly elevated at 1.44. I spoke with Dr. Radha Wynn urology and discussed case. Since patient is pain-free at this time he is recommending home with pain medication, Flomax, antibiotics. He most likely will need to do procedure for the stone but this can be done as an outpatient later this week. Patient updated on plan he is agreeable to this. He will be discharged home today. CRITICALCARE:      CONSULTS:  IP CONSULT TO UROLOGY      PROCEDURES:      FINAL IMPRESSION      1. Ureteral calculus, left            DISPOSITION/PLAN   DISPOSITION Decision To Discharge 08/23/2021 04:37:12 PM        PATIENT REFERRED TO:  Cain Curling, MD  [phone] [facility]  [phone]    Schedule an appointment as soon as possible for a visit       [facility] ED  [place] [address]  [phone]    As needed, If symptoms worsen    Eloisa Whitfield MD  [place] [address] [address] [place] [address]  [phone]    Schedule an appointment as soon as possible for a visit         DISCHARGE MEDICATIONS:  New Prescriptions    CEPHALEXIN (KEFLEX) 500 MG CAPSULE    Take 1 capsule by mouth 4 times daily for 7 days    HYDROCODONE-ACETAMINOPHEN (NORCO) 5-325 MG PER TABLET    Take 1 tablet by mouth every 6 hours as needed for Pain for up to 7 days.     IBUPROFEN (ADVIL;MOTRIN) 800 MG TABLET    Take 1 tablet by mouth every 8 hours as needed for Pain    TAMSULOSIN (FLOMAX) 0.4 MG CAPSULE    Take 1 capsule by mouth daily for 7 doses       (Please note that portions ofthis note were completed with a voice recognition program.  Efforts were made to edit the dictations but occasionally words are mis-transcribed.)    Cornell Quarles DO  Attending Emergency Physician          Cornell Quarles DO  08/23/21 8950

## 2021-08-23 NOTE — ED NOTES
Intermittent flank pain left sided for 2-3 months. Had pain 10 out of 10 today.       Pernell Saeed RN  08/23/21 3701

## 2021-08-27 ENCOUNTER — APPOINTMENT (OUTPATIENT)
Dept: GENERAL RADIOLOGY | Age: 46
End: 2021-08-27
Attending: UROLOGY
Payer: COMMERCIAL

## 2021-08-27 ENCOUNTER — ANESTHESIA EVENT (OUTPATIENT)
Dept: OPERATING ROOM | Age: 46
End: 2021-08-27
Payer: COMMERCIAL

## 2021-08-27 ENCOUNTER — ANESTHESIA (OUTPATIENT)
Dept: OPERATING ROOM | Age: 46
End: 2021-08-27
Payer: COMMERCIAL

## 2021-08-27 ENCOUNTER — HOSPITAL ENCOUNTER (OUTPATIENT)
Age: 46
Setting detail: OUTPATIENT SURGERY
Discharge: HOME | End: 2021-08-27
Attending: UROLOGY | Admitting: UROLOGY
Payer: COMMERCIAL

## 2021-08-27 VITALS — TEMPERATURE: 96.7 F | OXYGEN SATURATION: 100 % | DIASTOLIC BLOOD PRESSURE: 68 MMHG | SYSTOLIC BLOOD PRESSURE: 108 MMHG

## 2021-08-27 VITALS
RESPIRATION RATE: 16 BRPM | BODY MASS INDEX: 34.64 KG/M2 | DIASTOLIC BLOOD PRESSURE: 92 MMHG | WEIGHT: 255.73 LBS | HEART RATE: 62 BPM | TEMPERATURE: 96.8 F | OXYGEN SATURATION: 99 % | HEIGHT: 72 IN | SYSTOLIC BLOOD PRESSURE: 147 MMHG

## 2021-08-27 DIAGNOSIS — N20.1 URETERAL CALCULUS, LEFT: ICD-10-CM

## 2021-08-27 PROCEDURE — 3600000004 HC SURGERY LEVEL 4 BASE: Performed by: UROLOGY

## 2021-08-27 PROCEDURE — 2580000003 HC RX 258: Performed by: ANESTHESIOLOGY

## 2021-08-27 PROCEDURE — C1769 GUIDE WIRE: HCPCS | Performed by: UROLOGY

## 2021-08-27 PROCEDURE — 7100000011 HC PHASE II RECOVERY - ADDTL 15 MIN: Performed by: UROLOGY

## 2021-08-27 PROCEDURE — 7100000001 HC PACU RECOVERY - ADDTL 15 MIN: Performed by: UROLOGY

## 2021-08-27 PROCEDURE — 3600000014 HC SURGERY LEVEL 4 ADDTL 15MIN: Performed by: UROLOGY

## 2021-08-27 PROCEDURE — 3209999900 FLUORO FOR SURGICAL PROCEDURES

## 2021-08-27 PROCEDURE — 6360000002 HC RX W HCPCS

## 2021-08-27 PROCEDURE — 2580000003 HC RX 258: Performed by: UROLOGY

## 2021-08-27 PROCEDURE — 7100000010 HC PHASE II RECOVERY - FIRST 15 MIN: Performed by: UROLOGY

## 2021-08-27 PROCEDURE — C1758 CATHETER, URETERAL: HCPCS | Performed by: UROLOGY

## 2021-08-27 PROCEDURE — 7100000000 HC PACU RECOVERY - FIRST 15 MIN: Performed by: UROLOGY

## 2021-08-27 PROCEDURE — 2720000010 HC SURG SUPPLY STERILE: Performed by: UROLOGY

## 2021-08-27 PROCEDURE — C2617 STENT, NON-COR, TEM W/O DEL: HCPCS | Performed by: UROLOGY

## 2021-08-27 PROCEDURE — 2500000003 HC RX 250 WO HCPCS

## 2021-08-27 PROCEDURE — 3700000001 HC ADD 15 MINUTES (ANESTHESIA): Performed by: UROLOGY

## 2021-08-27 PROCEDURE — 2709999900 HC NON-CHARGEABLE SUPPLY: Performed by: UROLOGY

## 2021-08-27 PROCEDURE — 6370000000 HC RX 637 (ALT 250 FOR IP): Performed by: ANESTHESIOLOGY

## 2021-08-27 PROCEDURE — 3700000000 HC ANESTHESIA ATTENDED CARE: Performed by: UROLOGY

## 2021-08-27 DEVICE — URETERAL STENT
Type: IMPLANTABLE DEVICE | Site: URETER | Status: FUNCTIONAL
Brand: POLARIS™ ULTRA

## 2021-08-27 RX ORDER — MIDAZOLAM HYDROCHLORIDE 2 MG/2ML
1 INJECTION, SOLUTION INTRAMUSCULAR; INTRAVENOUS EVERY 10 MIN PRN
Status: DISCONTINUED | OUTPATIENT
Start: 2021-08-27 | End: 2021-08-27 | Stop reason: HOSPADM

## 2021-08-27 RX ORDER — FENTANYL CITRATE 50 UG/ML
INJECTION, SOLUTION INTRAMUSCULAR; INTRAVENOUS PRN
Status: DISCONTINUED | OUTPATIENT
Start: 2021-08-27 | End: 2021-08-27 | Stop reason: SDUPTHER

## 2021-08-27 RX ORDER — MIDAZOLAM HYDROCHLORIDE 1 MG/ML
INJECTION INTRAMUSCULAR; INTRAVENOUS PRN
Status: DISCONTINUED | OUTPATIENT
Start: 2021-08-27 | End: 2021-08-27 | Stop reason: SDUPTHER

## 2021-08-27 RX ORDER — TAMSULOSIN HYDROCHLORIDE 0.4 MG/1
0.4 CAPSULE ORAL DAILY
Qty: 14 CAPSULE | Refills: 0 | Status: SHIPPED | OUTPATIENT
Start: 2021-08-27 | End: 2022-01-14 | Stop reason: ALTCHOICE

## 2021-08-27 RX ORDER — ONDANSETRON 2 MG/ML
4 INJECTION INTRAMUSCULAR; INTRAVENOUS
Status: DISCONTINUED | OUTPATIENT
Start: 2021-08-27 | End: 2021-08-27 | Stop reason: HOSPADM

## 2021-08-27 RX ORDER — SODIUM CHLORIDE, SODIUM LACTATE, POTASSIUM CHLORIDE, CALCIUM CHLORIDE 600; 310; 30; 20 MG/100ML; MG/100ML; MG/100ML; MG/100ML
INJECTION, SOLUTION INTRAVENOUS CONTINUOUS
Status: DISCONTINUED | OUTPATIENT
Start: 2021-08-27 | End: 2021-08-27 | Stop reason: HOSPADM

## 2021-08-27 RX ORDER — LIDOCAINE HYDROCHLORIDE 10 MG/ML
INJECTION, SOLUTION EPIDURAL; INFILTRATION; INTRACAUDAL; PERINEURAL PRN
Status: DISCONTINUED | OUTPATIENT
Start: 2021-08-27 | End: 2021-08-27 | Stop reason: SDUPTHER

## 2021-08-27 RX ORDER — ONDANSETRON 2 MG/ML
INJECTION INTRAMUSCULAR; INTRAVENOUS PRN
Status: DISCONTINUED | OUTPATIENT
Start: 2021-08-27 | End: 2021-08-27 | Stop reason: SDUPTHER

## 2021-08-27 RX ORDER — FENTANYL CITRATE 50 UG/ML
25 INJECTION, SOLUTION INTRAMUSCULAR; INTRAVENOUS EVERY 5 MIN PRN
Status: DISCONTINUED | OUTPATIENT
Start: 2021-08-27 | End: 2021-08-27 | Stop reason: HOSPADM

## 2021-08-27 RX ORDER — CEPHALEXIN 500 MG/1
500 CAPSULE ORAL 4 TIMES DAILY
Qty: 12 CAPSULE | Refills: 0 | Status: SHIPPED | OUTPATIENT
Start: 2021-08-27 | End: 2021-08-30

## 2021-08-27 RX ORDER — FENTANYL CITRATE 50 UG/ML
50 INJECTION, SOLUTION INTRAMUSCULAR; INTRAVENOUS EVERY 5 MIN PRN
Status: DISCONTINUED | OUTPATIENT
Start: 2021-08-27 | End: 2021-08-27 | Stop reason: HOSPADM

## 2021-08-27 RX ORDER — HYDROCODONE BITARTRATE AND ACETAMINOPHEN 5; 325 MG/1; MG/1
1 TABLET ORAL
Status: COMPLETED | OUTPATIENT
Start: 2021-08-27 | End: 2021-08-27

## 2021-08-27 RX ORDER — MEPERIDINE HYDROCHLORIDE 50 MG/ML
12.5 INJECTION INTRAMUSCULAR; INTRAVENOUS; SUBCUTANEOUS EVERY 5 MIN PRN
Status: DISCONTINUED | OUTPATIENT
Start: 2021-08-27 | End: 2021-08-27 | Stop reason: HOSPADM

## 2021-08-27 RX ORDER — DEXAMETHASONE SODIUM PHOSPHATE 10 MG/ML
INJECTION INTRAMUSCULAR; INTRAVENOUS PRN
Status: DISCONTINUED | OUTPATIENT
Start: 2021-08-27 | End: 2021-08-27 | Stop reason: SDUPTHER

## 2021-08-27 RX ORDER — MAGNESIUM HYDROXIDE 1200 MG/15ML
LIQUID ORAL PRN
Status: DISCONTINUED | OUTPATIENT
Start: 2021-08-27 | End: 2021-08-27 | Stop reason: ALTCHOICE

## 2021-08-27 RX ORDER — MAGNESIUM HYDROXIDE 1200 MG/15ML
LIQUID ORAL CONTINUOUS PRN
Status: COMPLETED | OUTPATIENT
Start: 2021-08-27 | End: 2021-08-27

## 2021-08-27 RX ORDER — PROPOFOL 10 MG/ML
INJECTION, EMULSION INTRAVENOUS PRN
Status: DISCONTINUED | OUTPATIENT
Start: 2021-08-27 | End: 2021-08-27 | Stop reason: SDUPTHER

## 2021-08-27 RX ORDER — CEFAZOLIN SODIUM 2 G/50ML
SOLUTION INTRAVENOUS PRN
Status: DISCONTINUED | OUTPATIENT
Start: 2021-08-27 | End: 2021-08-27 | Stop reason: SDUPTHER

## 2021-08-27 RX ORDER — HYDROCODONE BITARTRATE AND ACETAMINOPHEN 5; 325 MG/1; MG/1
1 TABLET ORAL EVERY 6 HOURS PRN
Qty: 15 TABLET | Refills: 0 | Status: SHIPPED | OUTPATIENT
Start: 2021-08-27 | End: 2021-09-01

## 2021-08-27 RX ADMIN — ONDANSETRON 4 MG: 2 INJECTION, SOLUTION INTRAMUSCULAR; INTRAVENOUS at 12:45

## 2021-08-27 RX ADMIN — LIDOCAINE HYDROCHLORIDE 50 MG: 10 INJECTION, SOLUTION EPIDURAL; INFILTRATION; INTRACAUDAL; PERINEURAL at 12:02

## 2021-08-27 RX ADMIN — CEFAZOLIN SODIUM 2000 MG: 2 SOLUTION INTRAVENOUS at 12:09

## 2021-08-27 RX ADMIN — FENTANYL CITRATE 50 MCG: 50 INJECTION, SOLUTION INTRAMUSCULAR; INTRAVENOUS at 12:08

## 2021-08-27 RX ADMIN — HYDROCODONE BITARTRATE AND ACETAMINOPHEN 1 TABLET: 5; 325 TABLET ORAL at 13:50

## 2021-08-27 RX ADMIN — FENTANYL CITRATE 50 MCG: 50 INJECTION, SOLUTION INTRAMUSCULAR; INTRAVENOUS at 12:00

## 2021-08-27 RX ADMIN — PROPOFOL 200 MG: 10 INJECTION, EMULSION INTRAVENOUS at 12:02

## 2021-08-27 RX ADMIN — PROPOFOL 200 MG: 10 INJECTION, EMULSION INTRAVENOUS at 12:04

## 2021-08-27 RX ADMIN — SODIUM CHLORIDE, POTASSIUM CHLORIDE, SODIUM LACTATE AND CALCIUM CHLORIDE: 600; 310; 30; 20 INJECTION, SOLUTION INTRAVENOUS at 10:41

## 2021-08-27 RX ADMIN — MIDAZOLAM HYDROCHLORIDE 2 MG: 1 INJECTION, SOLUTION INTRAMUSCULAR; INTRAVENOUS at 12:00

## 2021-08-27 RX ADMIN — DEXAMETHASONE SODIUM PHOSPHATE 10 MG: 10 INJECTION INTRAMUSCULAR; INTRAVENOUS at 12:15

## 2021-08-27 ASSESSMENT — PULMONARY FUNCTION TESTS
PIF_VALUE: 1
PIF_VALUE: 14
PIF_VALUE: 13
PIF_VALUE: 14
PIF_VALUE: 16
PIF_VALUE: 1
PIF_VALUE: 9
PIF_VALUE: 14
PIF_VALUE: 12
PIF_VALUE: 2
PIF_VALUE: 14
PIF_VALUE: 14
PIF_VALUE: 0
PIF_VALUE: 21
PIF_VALUE: 8
PIF_VALUE: 15
PIF_VALUE: 13
PIF_VALUE: 2
PIF_VALUE: 14
PIF_VALUE: 14
PIF_VALUE: 15
PIF_VALUE: 16
PIF_VALUE: 0
PIF_VALUE: 10
PIF_VALUE: 1
PIF_VALUE: 14
PIF_VALUE: 15
PIF_VALUE: 14
PIF_VALUE: 14
PIF_VALUE: 13
PIF_VALUE: 15
PIF_VALUE: 17
PIF_VALUE: 14
PIF_VALUE: 14
PIF_VALUE: 15
PIF_VALUE: 16
PIF_VALUE: 3
PIF_VALUE: 20
PIF_VALUE: 0
PIF_VALUE: 0
PIF_VALUE: 13
PIF_VALUE: 15
PIF_VALUE: 0
PIF_VALUE: 15
PIF_VALUE: 14
PIF_VALUE: 8
PIF_VALUE: 14
PIF_VALUE: 0
PIF_VALUE: 4
PIF_VALUE: 15
PIF_VALUE: 0
PIF_VALUE: 16
PIF_VALUE: 24
PIF_VALUE: 14
PIF_VALUE: 1
PIF_VALUE: 15

## 2021-08-27 ASSESSMENT — PAIN SCALES - GENERAL
PAINLEVEL_OUTOF10: 5
PAINLEVEL_OUTOF10: 1
PAINLEVEL_OUTOF10: 1

## 2021-08-27 ASSESSMENT — PAIN DESCRIPTION - PAIN TYPE: TYPE: SURGICAL PAIN

## 2021-08-27 ASSESSMENT — PAIN DESCRIPTION - LOCATION: LOCATION: PENIS

## 2021-08-27 ASSESSMENT — PAIN DESCRIPTION - DESCRIPTORS: DESCRIPTORS: BURNING

## 2021-08-27 NOTE — OP NOTE
Dr. Acosta Montalvo MD      [facility]. [facility]. [place]    DATE:  8/27/2021    SURGEON:   Acosta Montalvo MD    ASSISTANT:  Wilson Sol MD. PGY5     PREOPERATIVE DIAGNOSIS:  LEFT URETERAL STONE    POSTOPERATIVE DIAGNOSIS:  same    PROCEDURE PERFORMED:   CYSTOSCOPY, LEFT URETEROSCOPY, HOLMIUM LASER LITHOTRIPSY , LEFT URETERAL STENT PLACEMENT     ANESTHESIA:  General    COMPLICATIONS:  None. ESTIMATED BLOOD LOSS:  < 5 cc      SPECIMENS:  * No specimens in log *     DRAINS:  left 6 Fr x 28 cm ureteral stent      INDICATIONS FOR PROCEDURE:  The patient is a 48 y.o. male with a history of kidney stones who is here today definitive treatment of stone burden. The risks and benefits of the procedure as well as possible alternatives and complications were discussed and he consented    DETAILS OF THE PROCEDURE:  The patient was correctly identified in the preoperative holding area. he was brought back to the operating room and General anesthesia was administered. he was then prepped and draped in the usual sterile fashion in the dorsal lithotomy position. EPC cuffs were on, in place, and fully functional. he was given 2gm ancef IV  for antibiotic prophylaxis. After appropriate time-out was performed with all parties agreeing, a 25 Fr cystoscope with a 30 degree lens was inserted into the bladder. Bladder was inspected including the dome posterior and lateral walls as well as the anterior wall and trigone. These were found to be free of tumors or mucosal abnormalities. There are no foreign bodies . A 0.035 glidewire was inserted through the scope into the left ureteral orrifice and visualized in the renal pelvis under fluoroscopy. A dual lumen catheter  was inserted over the wire. A second glidewire was inserted under fluoroscopic guidance and the catheter was removed. A flexible ureteroscope was inserted over the wire and advanced into the ureter.  Several spots there was some mild resistance which the ureteroscope was able to pass. There was a stonelocated in the mid urteer  A 200 micron holmium laser fiber was inserted and used to fragment the stone. This was retropulsed into the renal pelvis. The scope was advanced toward the pelvis, but there was a narrowing at the UPJ which we could not pass safely. The scope was withdrawn and the ureter was clear of stones. The cystoscope was then backloaded over the wire and a 6 Fr x28 cm stent was advanced with good curl visualized in the  left renal pelvis under fluoroscopy and in the bladder under direct visualization. The bladder was drained and the scope was removed. The stent was left without strings. Dr Stanislaw Prasad MD was scrubbed and present for the procedure. The patient tolerated the procedure well and was sent to PACU for postoperative monitoring. DISPOSITION:  The patient was discharged home in stable condition with instructions to follow up in 10-14 days for staged ureteroscopy.

## 2021-08-27 NOTE — H&P
History and Physical    Patient:  Kianna Branham  MRN: [id number]  YOB: 1975    CHIEF COMPLAINT:  Left ureteral stone    HISTORY OF PRESENT ILLNESS:   The patient is a 48 y.o. male who presents with left ureteral stone    Patient's old records, notes and chart reviewed and summarized above. Past Medical History:    Past Medical History:   Diagnosis Date    Anxiety     Depression     Left flank pain     Ureteral calculus, left     Wellness examination 07/02/2021    Dr. Yoel Galicia       Past Surgical History:    Past Surgical History:   Procedure Laterality Date    WISDOM TOOTH EXTRACTION       Medications Prior to Admission:    Prior to Admission medications    Medication Sig Start Date End Date Taking? Authorizing Provider   cephALEXin (KEFLEX) 500 MG capsule Take 1 capsule by mouth 4 times daily for 7 days 8/23/21 8/30/21 Yes Jasmine Wiley,    tamsulosin (FLOMAX) 0.4 MG capsule Take 1 capsule by mouth daily for 7 doses 8/23/21 8/30/21 Yes Jasmine Wiley,    HYDROcodone-acetaminophen (NORCO) 5-325 MG per tablet Take 1 tablet by mouth every 6 hours as needed for Pain for up to 7 days.  8/23/21 8/30/21 Yes Jasmine Wiley DO   citalopram (CELEXA) 40 MG tablet TAKE 1 TABLET DAILY 5/3/21  Yes  Guardian, MD   ibuprofen (ADVIL;MOTRIN) 800 MG tablet Take 1 tablet by mouth every 8 hours as needed for Pain 8/23/21   Jasmine Wiley,        Allergies:  Bactrim [sulfamethoxazole-trimethoprim]    Social History:    Social History     Socioeconomic History    Marital status:      Spouse name: Not on file    Number of children: Not on file    Years of education: Not on file    Highest education level: Not on file   Occupational History    Not on file   Tobacco Use    Smoking status: Never Smoker    Smokeless tobacco: Never Used   Vaping Use    Vaping Use: Never used   Substance and Sexual Activity    Alcohol use: Not Currently     Alcohol/week: 0.0 standard CTA  Cardiovascular:  Normal peripheral pulses; no murmur  Abdomen: Soft, non-tender, non-distended with no CVA, flank pain, hepatosplenomegaly or hernia. Kidneys normal.  Bladder non-tender and not distended. LABS:   No results for input(s): WBC, HGB, HCT, MCV, PLT in the last 72 hours. No results for input(s): NA, K, CL, CO2, PHOS, BUN, CREATININE in the last 72 hours. Invalid input(s): CA  No results found for: PSA        Urinalysis: No results for input(s): COLORU, PHUR, LABCAST, WBCUA, RBCUA, MUCUS, TRICHOMONAS, YEAST, BACTERIA, CLARITYU, SPECGRAV, LEUKOCYTESUR, UROBILINOGEN, Sebastian Mean in the last 72 hours. Invalid input(s): NITRATE, GLUCOSEUKETONESUAMORPHOUS     -----------------------------------------------------------------      Assessment and Plan   Impression:    Patient Active Problem List   Diagnosis    Prostatitis    Anxiety    Primary insomnia    Panic attacks       Plan:   Risks: I discussed all the risks, benefits, alternatives and possible complications or surgery as well as expectations and post-op recovery.       Consent obtained; cysto left URS and HLL and stent in OR today    Kandace Clements M.D, MD  11:52 AM 8/27/2021

## 2021-08-27 NOTE — ANESTHESIA POSTPROCEDURE EVALUATION
Department of Anesthesiology  Postprocedure Note    Patient: Miquel Newton  MRN: [id number]  [place]: 1975  Date of evaluation: 8/27/2021  Time:  4:24 PM     Procedure Summary     Date: 08/27/21 Room / Location: [address]    Anesthesia Start: 8995 Anesthesia Stop: 1519    Procedure: HOLMIUM, CYSTOSCOPY, URETEROSCOPY, STENT PLACEMENT (Left ) Diagnosis: (LEFT KIDNEY STONE)    Surgeons: Arun Macias MD Responsible Provider: Donovan Avendano MD    Anesthesia Type: general ASA Status: 2          Anesthesia Type: general    Chantell Phase I: Chantell Score: 10    Chantell Phase II: Chantell Score: 10    Last vitals: Reviewed and per EMR flowsheets.      POST-OP ANESTHESIA NOTE       BP (!) 147/92   Pulse 62   Temp 96.8 °F (36 °C) (Temporal)   Resp 16   Ht 6' (1.829 m)   Wt 255 lb 11.7 oz (116 kg)   SpO2 99%   BMI 34.68 kg/m²    Pain Assessment: 0-10  Pain Level: 5           Anesthesia Post Evaluation    Patient location during evaluation: PACU  Patient participation: complete - patient participated  Level of consciousness: awake  Pain score: 5  Airway patency: patent  Nausea & Vomiting: no vomiting and no nausea  Complications: no  Cardiovascular status: hemodynamically stable  Respiratory status: acceptable  Hydration status: stable
No

## 2021-08-30 NOTE — ADDENDUM NOTE
Addendum  created 08/30/21 1156 by DAHIANA Lenz CRNA    Intraprocedure Event edited, Intraprocedure Staff edited

## 2021-09-16 ENCOUNTER — TELEPHONE (OUTPATIENT)
Dept: UROLOGY | Age: 46
End: 2021-09-16

## 2021-09-16 DIAGNOSIS — N20.0 KIDNEY STONES: Primary | ICD-10-CM

## 2021-09-16 NOTE — TELEPHONE ENCOUNTER
Left message for patient to call back and schedule post URS with Dr. Marie Lizama, patient wishes to follow up in the [place] office due to his location. Instructed patient to call the office back to schedule for mid-late October with a Renal US prior to the appointment.

## 2021-11-01 DIAGNOSIS — F41.9 ANXIETY AND DEPRESSION: ICD-10-CM

## 2021-11-01 DIAGNOSIS — F41.0 PANIC ATTACKS: ICD-10-CM

## 2021-11-01 DIAGNOSIS — F32.A ANXIETY AND DEPRESSION: ICD-10-CM

## 2021-11-01 RX ORDER — CITALOPRAM 40 MG/1
TABLET ORAL
Qty: 90 TABLET | Refills: 1 | Status: SHIPPED | OUTPATIENT
Start: 2021-11-01 | End: 2022-05-02

## 2022-01-14 ENCOUNTER — OFFICE VISIT (OUTPATIENT)
Dept: PRIMARY CARE CLINIC | Age: 47
End: 2022-01-14
Payer: COMMERCIAL

## 2022-01-14 VITALS
OXYGEN SATURATION: 98 % | SYSTOLIC BLOOD PRESSURE: 118 MMHG | HEART RATE: 74 BPM | DIASTOLIC BLOOD PRESSURE: 82 MMHG | WEIGHT: 261.2 LBS | BODY MASS INDEX: 35.43 KG/M2

## 2022-01-14 DIAGNOSIS — F41.9 ANXIETY AND DEPRESSION: ICD-10-CM

## 2022-01-14 DIAGNOSIS — F32.A ANXIETY AND DEPRESSION: ICD-10-CM

## 2022-01-14 DIAGNOSIS — Z12.11 COLON CANCER SCREENING: ICD-10-CM

## 2022-01-14 DIAGNOSIS — Z79.899 MEDICATION MANAGEMENT: Primary | ICD-10-CM

## 2022-01-14 PROCEDURE — 99213 OFFICE O/P EST LOW 20 MIN: CPT | Performed by: FAMILY MEDICINE

## 2022-01-14 NOTE — PROGRESS NOTES
[facility] PRIMARY CARE  [address] [facility] [address]  Dept: Grecia Kessler is a 48 y.o. male Established patient, who presents today for his medical conditions/complaintsas noted below. Chief Complaint   Patient presents with    6 Month Follow-Up     anxiety- Pt states his anxiety is well controlled. HPI:     HPI  Anxiety under good control  Feels well    Reviewed HM    Reviewed prior notes None  Reviewed previous Labs    LDL Cholesterol (mg/dL)   Date Value   07/09/2021 113       (goal LDL is <100)   AST (U/L)   Date Value   08/23/2021 23     ALT (U/L)   Date Value   08/23/2021 30     BUN (mg/dL)   Date Value   08/23/2021 14     BP Readings from Last 3 Encounters:   01/14/22 118/82   08/27/21 (!) 147/92   08/27/21 108/68          (goal 120/80)    Past Medical History:   Diagnosis Date    Anxiety     Depression     Left flank pain     Ureteral calculus, left     Wellness examination 07/02/2021    Dr. Gwendel Skiff      Past Surgical History:   Procedure Laterality Date    URETER SURGERY Left 8/27/2021    HOLMIUM, CYSTOSCOPY, URETEROSCOPY, STENT PLACEMENT performed by Nelida Green MD at [address] [place] [address] Drive URETEROSCOPY Left 08/27/2021    stent placement. cystoscopy     WISDOM TOOTH EXTRACTION         Family History   Problem Relation Age of Onset    High Blood Pressure Mother     No Known Problems Father        Social History     Tobacco Use    Smoking status: Never Smoker    Smokeless tobacco: Never Used   Substance Use Topics    Alcohol use: Not Currently     Alcohol/week: 0.0 standard drinks      Current Outpatient Medications   Medication Sig Dispense Refill    citalopram (CELEXA) 40 MG tablet TAKE 1 TABLET DAILY 90 tablet 1     No current facility-administered medications for this visit.      Allergies   Allergen Reactions    Bactrim [Sulfamethoxazole-Trimethoprim] Itching and Swelling       Health Maintenance   Topic Date Due    Hepatitis C screen  Never done    HIV screen  Never done    Colon cancer screen colonoscopy  Never done    COVID-19 Vaccine (2 - Booster for [place] series) 07/09/2021    Flu vaccine (1) 09/01/2021    Depression Monitoring  01/25/2022    Diabetes screen  07/09/2024    DTaP/Tdap/Td vaccine (2 - Td or Tdap) 03/22/2026    Lipid screen  07/09/2026    Hepatitis A vaccine  Aged Out    Hepatitis B vaccine  Aged Out    Hib vaccine  Aged Out    Meningococcal (ACWY) vaccine  Aged Out    Pneumococcal 0-64 years Vaccine  Aged Out       Subjective:      Review of Systems   Constitutional: Negative. Objective:     /82   Pulse 74   Wt 261 lb 3.2 oz (118.5 kg)   SpO2 98%   BMI 35.43 kg/m²   Physical Exam  Vitals and nursing note reviewed. Constitutional:       General: He is not in acute distress. Appearance: He is well-developed. He is not ill-appearing. HENT:      Head: Normocephalic and atraumatic. Right Ear: External ear normal.      Left Ear: External ear normal.   Eyes:      General: No scleral icterus. Right eye: No discharge. Left eye: No discharge. Conjunctiva/sclera: Conjunctivae normal.   Neck:      Thyroid: No thyromegaly. Trachea: No tracheal deviation. Cardiovascular:      Rate and Rhythm: Normal rate and regular rhythm. Heart sounds: Normal heart sounds. Pulmonary:      Effort: Pulmonary effort is normal. No respiratory distress. Breath sounds: Normal breath sounds. No wheezing. Lymphadenopathy:      Cervical: No cervical adenopathy. Skin:     General: Skin is warm. Findings: No rash. Neurological:      Mental Status: He is alert and oriented to person, place, and time. Psychiatric:         Mood and Affect: Mood normal.         Behavior: Behavior normal.         Thought Content: Thought content normal.         Assessment:       Diagnosis Orders   1. Medication management     2.  Anxiety and depression     3. Colon cancer screening  Dudley Henderson DO, General Surgery, [address] [place] [address]:      Return in about 6 months (around 7/14/2022) for anxiety. Discussed COVID booster. He will think about it. Orders Placed This Encounter   Procedures   38 Dandy Malloryronaldo Santizo DO, General Surgery, [place]     Referral Priority:   Routine     Referral Type:   Eval and Treat     Referral Reason:   Specialty Services Required     Referred to Provider:   Sonja Neves DO     Requested Specialty:   General Surgery     Number of Visits Requested:   1     No orders of the defined types were placed in this encounter. Patient given educationalmaterials - see patient instructions. Discussed use, benefit, and side effectsof prescribed medications. All patient questions answered. Pt voiced understanding. Reviewed health maintenance. Instructed to continue current medications, watch diet and try to exercise. Patient agreed with treatment plan. Follow up as directed.      Electronicallysigned by Celsa qIbal MD on 1/14/2022 at 9:08 AM

## 2022-01-14 NOTE — PATIENT INSTRUCTIONS
Patient Education        A Healthy Lifestyle: Care Instructions  Your Care Instructions     A healthy lifestyle can help you feel good, stay at a healthy weight, and have plenty of energy for both work and play. A healthy lifestyle is something you can share with your whole family. A healthy lifestyle also can lower your risk for serious health problems, such as high blood pressure, heart disease, and diabetes. You can follow a few steps listed below to improve your health and the health of your family. Follow-up care is a key part of your treatment and safety. Be sure to make and go to all appointments, and call your doctor if you are having problems. It's also a good idea to know your test results and keep a list of the medicines you take. How can you care for yourself at home? · Do not eat too much sugar, fat, or fast foods. You can still have dessert and treats now and then. The goal is moderation. · Start small to improve your eating habits. Pay attention to portion sizes, drink less juice and soda pop, and eat more fruits and vegetables. ? Eat a healthy amount of food. A 3-ounce serving of meat, for example, is about the size of a deck of cards. Fill the rest of your plate with vegetables and whole grains. ? Limit the amount of soda and sports drinks you have every day. Drink more water when you are thirsty. ? Eat plenty of fruits and vegetables every day. Have an apple or some carrot sticks as an afternoon snack instead of a candy bar. Try to have fruits and/or vegetables at every meal.  · Make exercise part of your daily routine. You may want to start with simple activities, such as walking, bicycling, or slow swimming. Try to be active 30 to 60 minutes every day. You do not need to do all 30 to 60 minutes all at once. For example, you can exercise 3 times a day for 10 or 20 minutes.  Moderate exercise is safe for most people, but it is always a good idea to talk to your doctor before starting an exercise program.  · Keep moving. [place] the lawn, work in the garden, or [place]. Take the stairs instead of the elevator at work. · If you smoke, quit. People who smoke have an increased risk for heart attack, stroke, cancer, and other lung illnesses. Quitting is hard, but there are ways to boost your chance of quitting tobacco for good. ? Use nicotine gum, patches, or lozenges. ? Ask your doctor about stop-smoking programs and medicines. ? Keep trying. In addition to reducing your risk of diseases in the future, you will notice some benefits soon after you stop using tobacco. If you have shortness of breath or asthma symptoms, they will likely get better within a few weeks after you quit. · Limit how much alcohol you drink. Moderate amounts of alcohol (up to 2 drinks a day for men, 1 drink a day for women) are okay. But drinking too much can lead to liver problems, high blood pressure, and other health problems. Family health  If you have a family, there are many things you can do together to improve your health. · Eat meals together as a family as often as possible. · Eat healthy foods. This includes fruits, vegetables, lean meats and dairy, and whole grains. · Include your family in your fitness plan. Most people think of activities such as jogging or tennis as the way to fitness, but there are many ways you and your family can be more active. Anything that makes you breathe hard and gets your heart pumping is exercise. Here are some tips:  ? Walk to do errands or to take your child to school or the bus.  ? Go for a family bike ride after dinner instead of watching TV. Where can you learn more? Go to https://MEDOP SERVICEScarrie.healthHome Inventory S[pecialists. org and sign in to your [employer] account. Enter S548 in the Hurricane Party box to learn more about \"A Healthy Lifestyle: Care Instructions. \"     If you do not have an account, please click on the \"Sign Up Now\" link.   Current as of: June 16, 2021               Content Version: 13.1  © 0715-7194 Healthwise, Incorporated. Care instructions adapted under license by [facility] ([facility]). If you have questions about a medical condition or this instruction, always ask your healthcare professional. [address] any warranty or liability for your use of this information.

## 2022-01-18 ENCOUNTER — TELEPHONE (OUTPATIENT)
Dept: SURGERY | Age: 47
End: 2022-01-18

## 2022-01-21 ENCOUNTER — TELEPHONE (OUTPATIENT)
Dept: SURGERY | Age: 47
End: 2022-01-21

## 2022-01-25 ENCOUNTER — TELEPHONE (OUTPATIENT)
Dept: SURGERY | Age: 47
End: 2022-01-25

## 2022-04-30 DIAGNOSIS — F32.A ANXIETY AND DEPRESSION: ICD-10-CM

## 2022-04-30 DIAGNOSIS — F41.0 PANIC ATTACKS: ICD-10-CM

## 2022-04-30 DIAGNOSIS — F41.9 ANXIETY AND DEPRESSION: ICD-10-CM

## 2022-05-02 RX ORDER — CITALOPRAM 40 MG/1
TABLET ORAL
Qty: 90 TABLET | Refills: 1 | Status: SHIPPED | OUTPATIENT
Start: 2022-05-02 | End: 2022-10-20

## 2022-08-16 ENCOUNTER — OFFICE VISIT (OUTPATIENT)
Dept: PRIMARY CARE CLINIC | Age: 47
End: 2022-08-16
Payer: COMMERCIAL

## 2022-08-16 VITALS
WEIGHT: 241.2 LBS | DIASTOLIC BLOOD PRESSURE: 82 MMHG | HEIGHT: 72 IN | OXYGEN SATURATION: 98 % | BODY MASS INDEX: 32.67 KG/M2 | HEART RATE: 72 BPM | SYSTOLIC BLOOD PRESSURE: 124 MMHG

## 2022-08-16 DIAGNOSIS — R68.82 LIBIDO, DECREASED: ICD-10-CM

## 2022-08-16 DIAGNOSIS — F32.A ANXIETY AND DEPRESSION: ICD-10-CM

## 2022-08-16 DIAGNOSIS — Z12.11 COLON CANCER SCREENING: ICD-10-CM

## 2022-08-16 DIAGNOSIS — Z79.899 MEDICATION MANAGEMENT: Primary | ICD-10-CM

## 2022-08-16 DIAGNOSIS — F41.9 ANXIETY AND DEPRESSION: ICD-10-CM

## 2022-08-16 DIAGNOSIS — N52.9 ERECTILE DYSFUNCTION, UNSPECIFIED ERECTILE DYSFUNCTION TYPE: ICD-10-CM

## 2022-08-16 PROCEDURE — 99213 OFFICE O/P EST LOW 20 MIN: CPT | Performed by: FAMILY MEDICINE

## 2022-08-16 SDOH — ECONOMIC STABILITY: FOOD INSECURITY: WITHIN THE PAST 12 MONTHS, THE FOOD YOU BOUGHT JUST DIDN'T LAST AND YOU DIDN'T HAVE MONEY TO GET MORE.: NEVER TRUE

## 2022-08-16 SDOH — ECONOMIC STABILITY: FOOD INSECURITY: WITHIN THE PAST 12 MONTHS, YOU WORRIED THAT YOUR FOOD WOULD RUN OUT BEFORE YOU GOT MONEY TO BUY MORE.: NEVER TRUE

## 2022-08-16 ASSESSMENT — PATIENT HEALTH QUESTIONNAIRE - PHQ9
SUM OF ALL RESPONSES TO PHQ QUESTIONS 1-9: 0
3. TROUBLE FALLING OR STAYING ASLEEP: 0
1. LITTLE INTEREST OR PLEASURE IN DOING THINGS: 0
7. TROUBLE CONCENTRATING ON THINGS, SUCH AS READING THE NEWSPAPER OR WATCHING TELEVISION: 0
5. POOR APPETITE OR OVEREATING: 0
9. THOUGHTS THAT YOU WOULD BE BETTER OFF DEAD, OR OF HURTING YOURSELF: 0
4. FEELING TIRED OR HAVING LITTLE ENERGY: 0
SUM OF ALL RESPONSES TO PHQ QUESTIONS 1-9: 0
10. IF YOU CHECKED OFF ANY PROBLEMS, HOW DIFFICULT HAVE THESE PROBLEMS MADE IT FOR YOU TO DO YOUR WORK, TAKE CARE OF THINGS AT HOME, OR GET ALONG WITH OTHER PEOPLE: 0
SUM OF ALL RESPONSES TO PHQ QUESTIONS 1-9: 0
8. MOVING OR SPEAKING SO SLOWLY THAT OTHER PEOPLE COULD HAVE NOTICED. OR THE OPPOSITE, BEING SO FIGETY OR RESTLESS THAT YOU HAVE BEEN MOVING AROUND A LOT MORE THAN USUAL: 0
6. FEELING BAD ABOUT YOURSELF - OR THAT YOU ARE A FAILURE OR HAVE LET YOURSELF OR YOUR FAMILY DOWN: 0
2. FEELING DOWN, DEPRESSED OR HOPELESS: 0
SUM OF ALL RESPONSES TO PHQ9 QUESTIONS 1 & 2: 0
SUM OF ALL RESPONSES TO PHQ QUESTIONS 1-9: 0

## 2022-08-16 ASSESSMENT — ENCOUNTER SYMPTOMS: RESPIRATORY NEGATIVE: 1

## 2022-08-16 ASSESSMENT — SOCIAL DETERMINANTS OF HEALTH (SDOH): HOW HARD IS IT FOR YOU TO PAY FOR THE VERY BASICS LIKE FOOD, HOUSING, MEDICAL CARE, AND HEATING?: NOT HARD AT ALL

## 2022-08-16 NOTE — PROGRESS NOTES
[address] [facility] PRIMARY CARE  [address] [facility] [address]  Dept: Grecia Kessler is a 50 y.o. male Established patient, who presents today for his medical conditions/complaintsas noted below. Chief Complaint   Patient presents with    Anxiety     Pt here today for 6 month. HPI:     HPI    Reviewed prior notes None  Reviewed previous Labs      LDL Cholesterol (mg/dL)   Date Value   07/09/2021 113       (goal LDL is <100)   AST (U/L)   Date Value   08/23/2021 23     ALT (U/L)   Date Value   08/23/2021 30     BUN (mg/dL)   Date Value   08/23/2021 14     BP Readings from Last 3 Encounters:   08/16/22 124/82   01/14/22 118/82   08/27/21 (!) 147/92          (goal 120/80)    Past Medical History:   Diagnosis Date    Anxiety     Depression     Left flank pain     Ureteral calculus, left     Wellness examination 07/02/2021    Dr. Russell Fontaine      Past Surgical History:   Procedure Laterality Date    URETER SURGERY Left 8/27/2021    HOLMIUM, CYSTOSCOPY, URETEROSCOPY, STENT PLACEMENT performed by Mady Arroyo MD at [place] [address]    URETEROSCOPY Left 08/27/2021    stent placement. cystoscopy     WISDOM TOOTH EXTRACTION         Family History   Problem Relation Age of Onset    High Blood Pressure Mother     No Known Problems Father        Social History     Tobacco Use    Smoking status: Never    Smokeless tobacco: Never   Substance Use Topics    Alcohol use: Not Currently     Alcohol/week: 0.0 standard drinks      Current Outpatient Medications   Medication Sig Dispense Refill    citalopram (CELEXA) 40 MG tablet TAKE 1 TABLET DAILY 90 tablet 1     No current facility-administered medications for this visit.      Allergies   Allergen Reactions    Bactrim [Sulfamethoxazole-Trimethoprim] Itching and Swelling       Health Maintenance   Topic Date Due    HIV screen  Never done    Hepatitis C screen  Never done    Colorectal Cancer Screen  Never done    COVID-19 Vaccine (2 - Booster for [place] series) 07/09/2021    Depression Monitoring  01/25/2022    Flu vaccine (1) 09/01/2022    Diabetes screen  07/09/2024    DTaP/Tdap/Td vaccine (2 - Td or Tdap) 03/22/2026    Lipids  07/09/2026    Hepatitis A vaccine  Aged Out    Hepatitis B vaccine  Aged Out    Hib vaccine  Aged Out    Meningococcal (ACWY) vaccine  Aged Out    Pneumococcal 0-64 years Vaccine  Aged Out       Subjective:      Review of Systems   Respiratory: Negative. Cardiovascular: Negative. Decreased libido and decreased ability to maintain erection, goes away too fast.     Objective:     /82   Pulse 72   Ht 6' (1.829 m)   Wt 241 lb 3.2 oz (109.4 kg)   SpO2 98%   BMI 32.71 kg/m²   Physical Exam  Vitals and nursing note reviewed. Constitutional:       General: He is not in acute distress. Appearance: He is well-developed. He is not ill-appearing. HENT:      Head: Normocephalic and atraumatic. Right Ear: External ear normal.      Left Ear: External ear normal.   Eyes:      General: No scleral icterus. Right eye: No discharge. Left eye: No discharge. Conjunctiva/sclera: Conjunctivae normal.   Neck:      Thyroid: No thyromegaly. Trachea: No tracheal deviation. Cardiovascular:      Rate and Rhythm: Normal rate and regular rhythm. Heart sounds: Normal heart sounds. Pulmonary:      Effort: Pulmonary effort is normal. No respiratory distress. Breath sounds: Normal breath sounds. No wheezing. Musculoskeletal:      Right lower leg: No edema. Left lower leg: No edema. Lymphadenopathy:      Cervical: No cervical adenopathy. Skin:     General: Skin is warm. Findings: No rash. Neurological:      Mental Status: He is alert and oriented to person, place, and time. Psychiatric:         Mood and Affect: Mood normal.         Behavior: Behavior normal.         Thought Content:  Thought content normal.       Assessment: Diagnosis Orders   1. Medication management  Basic Metabolic Panel, Fasting      2. Anxiety and depression        3. Libido, decreased  Testosterone Free Bio Total      4. Erectile dysfunction, unspecified erectile dysfunction type  Testosterone Free Bio Total      5. Colon cancer screening  Akua Rosales DO, General Surgery, [place]             Plan:      Return in about 6 months (around 2/16/2023). Discussed possible low Testosterone  If hormone  levels ok consider trying wellbutrin or ED medicine    Orders Placed This Encounter   Procedures    Basic Metabolic Panel, Fasting     Standing Status:   Future     Standing Expiration Date:   2/16/2023    Testosterone Free Bio Total     Standing Status:   Future     Standing Expiration Date:   8/16/2023    Karrie Robert DO, General Surgery, [place]     Referral Priority:   Routine     Referral Type:   Eval and Treat     Referral Reason:   Specialty Services Required     Referred to Provider:   Nba Appiah DO     Requested Specialty:   General Surgery     Number of Visits Requested:   1     No orders of the defined types were placed in this encounter. Patient given educationalmaterials - see patient instructions. Discussed use, benefit, and side effectsof prescribed medications. All patient questions answered. Pt voiced understanding. Reviewed health maintenance. Instructed to continue current medications, diet andexercise. Patient agreed with treatment plan. Follow up as directed.      Electronicallysigned by Trent Garduno MD on 8/16/2022 at 5:00 PM

## 2022-08-17 ENCOUNTER — TELEPHONE (OUTPATIENT)
Dept: SURGERY | Age: 47
End: 2022-08-17

## 2022-08-18 ENCOUNTER — TELEPHONE (OUTPATIENT)
Dept: SURGERY | Age: 47
End: 2022-08-18

## 2022-08-18 NOTE — TELEPHONE ENCOUNTER
[address] [facility] Surgery  Screening colonoscopy questionnaire  Levon Abernathy    Pt Name: Robert Wilkinson  MRN: [id number]  YOB: 1975  Primary Care Physician: Wanda Tovar MD      Have you ever had a colonoscopy? NO  When was your last colonoscopy? N/A  Were any polyps removed or any other significant findings? N/A    Do you have any family history of colon cancer? No  If yes, which family member had colon cancer? N/A  Were they diagnosed younger or older than the age of 61? N/A    Do you have a history of Crohn's disease or Ulcerative Colitis? No    Do you have a history of constipation? No    Do you have a history of bloody or black stools? No    Have you ever had surgery done inside your abdomen? No  What surgery? N/A      Schedulin/18/22- Spoke to patient, colonoscopy scheduled at [facility], patient confirmed and information mailed to patient.      Surgery date/time: 10/21/22 at 8:30AM  Arrival time: 7AM  Prep appt: 10/14/22 at [facility]

## 2022-08-19 DIAGNOSIS — Z79.899 MEDICATION MANAGEMENT: ICD-10-CM

## 2022-08-19 DIAGNOSIS — R68.82 LIBIDO, DECREASED: ICD-10-CM

## 2022-08-19 DIAGNOSIS — N52.9 ERECTILE DYSFUNCTION, UNSPECIFIED ERECTILE DYSFUNCTION TYPE: ICD-10-CM

## 2022-08-19 LAB
ANION GAP SERPL CALCULATED.3IONS-SCNC: 11 MMOL/L (ref 9–17)
BUN BLDV-MCNC: 11 MG/DL (ref 6–20)
CALCIUM SERPL-MCNC: 9.2 MG/DL (ref 8.6–10.4)
CHLORIDE BLD-SCNC: 105 MMOL/L (ref 98–107)
CO2: 25 MMOL/L (ref 20–31)
CREAT SERPL-MCNC: 1 MG/DL (ref 0.7–1.2)
GFR AFRICAN AMERICAN: >60 ML/MIN
GFR NON-AFRICAN AMERICAN: >60 ML/MIN
GFR SERPL CREATININE-BSD FRML MDRD: NORMAL ML/MIN/{1.73_M2}
GLUCOSE FASTING: 91 MG/DL (ref 70–99)
POTASSIUM SERPL-SCNC: 4.2 MMOL/L (ref 3.7–5.3)
SEX HORMONE BINDING GLOBULIN: 25 NMOL/L (ref 11–80)
SODIUM BLD-SCNC: 141 MMOL/L (ref 135–144)
TESTOSTERONE FREE-NONMALE: 95.7 PG/ML (ref 47–244)
TESTOSTERONE TOTAL: 404 NG/DL (ref 220–1000)
TESTOSTERONE, BIOAVAILABLE: 224.3 NG/DL (ref 130–680)

## 2022-10-10 ENCOUNTER — TELEPHONE (OUTPATIENT)
Dept: SURGERY | Age: 47
End: 2022-10-10

## 2022-10-10 RX ORDER — SOD SULF/POT CHLORIDE/MAG SULF 1.479 G
TABLET ORAL
Qty: 24 TABLET | Refills: 0 | Status: ON HOLD | OUTPATIENT
Start: 2022-10-10 | End: 2022-10-21 | Stop reason: HOSPADM

## 2022-10-20 DIAGNOSIS — F41.9 ANXIETY AND DEPRESSION: ICD-10-CM

## 2022-10-20 DIAGNOSIS — F41.0 PANIC ATTACKS: ICD-10-CM

## 2022-10-20 DIAGNOSIS — F32.A ANXIETY AND DEPRESSION: ICD-10-CM

## 2022-10-20 RX ORDER — CITALOPRAM 40 MG/1
TABLET ORAL
Qty: 90 TABLET | Refills: 1 | Status: SHIPPED | OUTPATIENT
Start: 2022-10-20

## 2022-10-24 ENCOUNTER — TELEPHONE (OUTPATIENT)
Dept: SURGERY | Age: 47
End: 2022-10-24

## 2022-10-24 NOTE — TELEPHONE ENCOUNTER
10/24/22- Spoke to patient, colonoscopy rescheduled at [facility]. Patient confirmed and information mailed to patient.      Surgery date/time: 12/9/22 at 11:30am  Arrival time: 10am  Prep appt: completed

## 2022-11-28 ENCOUNTER — TELEPHONE (OUTPATIENT)
Dept: SURGERY | Age: 47
End: 2022-11-28

## 2022-11-29 NOTE — DISCHARGE INSTRUCTIONS

## 2022-12-07 ENCOUNTER — ANESTHESIA EVENT (OUTPATIENT)
Dept: OPERATING ROOM | Age: 47
End: 2022-12-07
Payer: COMMERCIAL

## 2022-12-07 NOTE — TELEPHONE ENCOUNTER
12/7/22- Spoke to patient, procedure start time moved to 10am, arrival time is 8:30am. Patient confirmed

## 2022-12-09 ENCOUNTER — ANESTHESIA (OUTPATIENT)
Dept: OPERATING ROOM | Age: 47
End: 2022-12-09
Payer: COMMERCIAL

## 2022-12-09 ENCOUNTER — HOSPITAL ENCOUNTER (OUTPATIENT)
Age: 47
Setting detail: OUTPATIENT SURGERY
Discharge: HOME | End: 2022-12-09
Attending: SURGERY | Admitting: SURGERY
Payer: COMMERCIAL

## 2022-12-09 VITALS
HEART RATE: 64 BPM | DIASTOLIC BLOOD PRESSURE: 93 MMHG | RESPIRATION RATE: 15 BRPM | WEIGHT: 248 LBS | TEMPERATURE: 96.5 F | HEIGHT: 72 IN | BODY MASS INDEX: 33.59 KG/M2 | SYSTOLIC BLOOD PRESSURE: 128 MMHG | OXYGEN SATURATION: 100 %

## 2022-12-09 DIAGNOSIS — Z12.11 SCREENING FOR COLON CANCER: ICD-10-CM

## 2022-12-09 PROCEDURE — 3700000001 HC ADD 15 MINUTES (ANESTHESIA): Performed by: SURGERY

## 2022-12-09 PROCEDURE — 7100000011 HC PHASE II RECOVERY - ADDTL 15 MIN: Performed by: SURGERY

## 2022-12-09 PROCEDURE — 45385 COLONOSCOPY W/LESION REMOVAL: CPT | Performed by: SURGERY

## 2022-12-09 PROCEDURE — 2580000003 HC RX 258: Performed by: ANESTHESIOLOGY

## 2022-12-09 PROCEDURE — 3609010600 HC COLONOSCOPY POLYPECTOMY SNARE/COLD BIOPSY: Performed by: SURGERY

## 2022-12-09 PROCEDURE — 45380 COLONOSCOPY AND BIOPSY: CPT | Performed by: SURGERY

## 2022-12-09 PROCEDURE — 3700000000 HC ANESTHESIA ATTENDED CARE: Performed by: SURGERY

## 2022-12-09 PROCEDURE — 2500000003 HC RX 250 WO HCPCS

## 2022-12-09 PROCEDURE — 6360000002 HC RX W HCPCS

## 2022-12-09 PROCEDURE — 7100000010 HC PHASE II RECOVERY - FIRST 15 MIN: Performed by: SURGERY

## 2022-12-09 PROCEDURE — 2709999900 HC NON-CHARGEABLE SUPPLY: Performed by: SURGERY

## 2022-12-09 PROCEDURE — 88305 TISSUE EXAM BY PATHOLOGIST: CPT

## 2022-12-09 RX ORDER — SODIUM CHLORIDE 0.9 % (FLUSH) 0.9 %
5-40 SYRINGE (ML) INJECTION EVERY 12 HOURS SCHEDULED
Status: DISCONTINUED | OUTPATIENT
Start: 2022-12-09 | End: 2022-12-09 | Stop reason: HOSPADM

## 2022-12-09 RX ORDER — MEPERIDINE HYDROCHLORIDE 50 MG/ML
12.5 INJECTION INTRAMUSCULAR; INTRAVENOUS; SUBCUTANEOUS ONCE
Status: DISCONTINUED | OUTPATIENT
Start: 2022-12-09 | End: 2022-12-09 | Stop reason: HOSPADM

## 2022-12-09 RX ORDER — MORPHINE SULFATE 2 MG/ML
1 INJECTION, SOLUTION INTRAMUSCULAR; INTRAVENOUS EVERY 5 MIN PRN
Status: DISCONTINUED | OUTPATIENT
Start: 2022-12-09 | End: 2022-12-09 | Stop reason: HOSPADM

## 2022-12-09 RX ORDER — LIDOCAINE HYDROCHLORIDE 10 MG/ML
INJECTION, SOLUTION INFILTRATION; PERINEURAL PRN
Status: DISCONTINUED | OUTPATIENT
Start: 2022-12-09 | End: 2022-12-09 | Stop reason: SDUPTHER

## 2022-12-09 RX ORDER — SODIUM CHLORIDE 9 MG/ML
INJECTION, SOLUTION INTRAVENOUS PRN
Status: DISCONTINUED | OUTPATIENT
Start: 2022-12-09 | End: 2022-12-09 | Stop reason: HOSPADM

## 2022-12-09 RX ORDER — ONDANSETRON 2 MG/ML
4 INJECTION INTRAMUSCULAR; INTRAVENOUS
Status: DISCONTINUED | OUTPATIENT
Start: 2022-12-09 | End: 2022-12-09 | Stop reason: HOSPADM

## 2022-12-09 RX ORDER — SODIUM CHLORIDE 9 MG/ML
25 INJECTION, SOLUTION INTRAVENOUS PRN
Status: DISCONTINUED | OUTPATIENT
Start: 2022-12-09 | End: 2022-12-09 | Stop reason: HOSPADM

## 2022-12-09 RX ORDER — SODIUM CHLORIDE, SODIUM LACTATE, POTASSIUM CHLORIDE, CALCIUM CHLORIDE 600; 310; 30; 20 MG/100ML; MG/100ML; MG/100ML; MG/100ML
INJECTION, SOLUTION INTRAVENOUS CONTINUOUS
Status: DISCONTINUED | OUTPATIENT
Start: 2022-12-09 | End: 2022-12-09 | Stop reason: HOSPADM

## 2022-12-09 RX ORDER — SODIUM CHLORIDE 0.9 % (FLUSH) 0.9 %
5-40 SYRINGE (ML) INJECTION PRN
Status: DISCONTINUED | OUTPATIENT
Start: 2022-12-09 | End: 2022-12-09 | Stop reason: HOSPADM

## 2022-12-09 RX ORDER — SODIUM CHLORIDE 9 MG/ML
INJECTION, SOLUTION INTRAVENOUS CONTINUOUS
Status: DISCONTINUED | OUTPATIENT
Start: 2022-12-09 | End: 2022-12-09 | Stop reason: HOSPADM

## 2022-12-09 RX ORDER — PROPOFOL 10 MG/ML
INJECTION, EMULSION INTRAVENOUS PRN
Status: DISCONTINUED | OUTPATIENT
Start: 2022-12-09 | End: 2022-12-09 | Stop reason: SDUPTHER

## 2022-12-09 RX ORDER — DIPHENHYDRAMINE HYDROCHLORIDE 50 MG/ML
12.5 INJECTION INTRAMUSCULAR; INTRAVENOUS
Status: DISCONTINUED | OUTPATIENT
Start: 2022-12-09 | End: 2022-12-09 | Stop reason: HOSPADM

## 2022-12-09 RX ADMIN — LIDOCAINE HYDROCHLORIDE 40 MG: 10 INJECTION, SOLUTION INFILTRATION; PERINEURAL at 09:06

## 2022-12-09 RX ADMIN — PROPOFOL 40 MG: 10 INJECTION, EMULSION INTRAVENOUS at 09:11

## 2022-12-09 RX ADMIN — PROPOFOL 20 MG: 10 INJECTION, EMULSION INTRAVENOUS at 09:29

## 2022-12-09 RX ADMIN — PROPOFOL 50 MG: 10 INJECTION, EMULSION INTRAVENOUS at 09:44

## 2022-12-09 RX ADMIN — PROPOFOL 50 MG: 10 INJECTION, EMULSION INTRAVENOUS at 09:16

## 2022-12-09 RX ADMIN — PROPOFOL 40 MG: 10 INJECTION, EMULSION INTRAVENOUS at 09:56

## 2022-12-09 RX ADMIN — PROPOFOL 20 MG: 10 INJECTION, EMULSION INTRAVENOUS at 09:31

## 2022-12-09 RX ADMIN — PROPOFOL 40 MG: 10 INJECTION, EMULSION INTRAVENOUS at 09:08

## 2022-12-09 RX ADMIN — PROPOFOL 40 MG: 10 INJECTION, EMULSION INTRAVENOUS at 09:39

## 2022-12-09 RX ADMIN — PROPOFOL 40 MG: 10 INJECTION, EMULSION INTRAVENOUS at 09:26

## 2022-12-09 RX ADMIN — PROPOFOL 40 MG: 10 INJECTION, EMULSION INTRAVENOUS at 09:24

## 2022-12-09 RX ADMIN — PROPOFOL 40 MG: 10 INJECTION, EMULSION INTRAVENOUS at 09:42

## 2022-12-09 RX ADMIN — PROPOFOL 40 MG: 10 INJECTION, EMULSION INTRAVENOUS at 09:53

## 2022-12-09 RX ADMIN — PROPOFOL 60 MG: 10 INJECTION, EMULSION INTRAVENOUS at 09:06

## 2022-12-09 RX ADMIN — PROPOFOL 40 MG: 10 INJECTION, EMULSION INTRAVENOUS at 09:12

## 2022-12-09 RX ADMIN — SODIUM CHLORIDE, POTASSIUM CHLORIDE, SODIUM LACTATE AND CALCIUM CHLORIDE: 600; 310; 30; 20 INJECTION, SOLUTION INTRAVENOUS at 08:59

## 2022-12-09 RX ADMIN — PROPOFOL 40 MG: 10 INJECTION, EMULSION INTRAVENOUS at 09:59

## 2022-12-09 RX ADMIN — PROPOFOL 40 MG: 10 INJECTION, EMULSION INTRAVENOUS at 09:22

## 2022-12-09 RX ADMIN — PROPOFOL 40 MG: 10 INJECTION, EMULSION INTRAVENOUS at 09:51

## 2022-12-09 RX ADMIN — PROPOFOL 20 MG: 10 INJECTION, EMULSION INTRAVENOUS at 10:01

## 2022-12-09 RX ADMIN — PROPOFOL 40 MG: 10 INJECTION, EMULSION INTRAVENOUS at 09:36

## 2022-12-09 RX ADMIN — PROPOFOL 40 MG: 10 INJECTION, EMULSION INTRAVENOUS at 09:33

## 2022-12-09 RX ADMIN — PROPOFOL 40 MG: 10 INJECTION, EMULSION INTRAVENOUS at 09:19

## 2022-12-09 RX ADMIN — PROPOFOL 40 MG: 10 INJECTION, EMULSION INTRAVENOUS at 09:47

## 2022-12-09 ASSESSMENT — PAIN - FUNCTIONAL ASSESSMENT: PAIN_FUNCTIONAL_ASSESSMENT: 0-10

## 2022-12-09 NOTE — OP NOTE
Operative Note      Patient: Lennox Glimpse  YOB: 1975  MRN: [id number]    Date of Procedure: 12/9/2022    Pre-Op Diagnosis: Screening for colon cancer [Z12.11]    Post-Op Diagnosis:   Descending colon polyp x2  Sigmoid diverticulosis  Internal hemorrhoids       Procedure(s):  COLONOSCOPY WITH DESCENDING COLON POLYPECTOMIES    Surgeon(s):  Yanet Mayer DO    Assistant:   Resident: Cody Cunnnigham DO    Anesthesia: Monitor Anesthesia Care    Estimated Blood Loss (mL): Minimal    Complications: None    Specimens:   ID Type Source Tests Collected by Time Destination   A : DESCENDING COLON POLYPS Tissue Colon-Descending SURGICAL PATHOLOGY Yanet Mayer DO 12/9/2022 2296        Implants:  * No implants in log *      Drains: * No LDAs found *    Findings: as above    Detailed Description of Procedure:     INDICATIONS FOR PROCEDURE:   The patient is a 50y.o. year old male with history of above preop diagnosis. Colonoscopy with possible biopsy or polypectomy was recommend, the risk, benefits, expected outcome, and alternatives to the procedure were explained. Risks included but are not limited to bleeding, infection, respiratory distress, hypotension, and perforation of the colon. The patient understands and is in agreement. PROCEDURE DETAILS:  Patient was brought to the endoscopy suite and placed in the left lateral recumbent position. A time out was completed verifying correct patient, procedure and equipment. Anesthesia was induced using MAC guidelines. A digital rectal exam was performed. The colonoscope was inserted into the rectum without difficulty and the scope was advanced to the cecum which was identified by anatomy and by palpation. Bowel prep was adequate. The scope was slowly withdrawn visualizing the cecum, ascending colon, transverse colon, descending colon, sigmoid colon and rectum. The scope was withdrawn to the rectal vault and then retroflexed.  The scope was then straightened and removed. The patient tolerated the procedure well and was transferred to the recovery unit in stable condition. Findings:    Polyps:   Descending colon polyp x2 6mm and 1cm removed with cold forcep and hot snare    Other findings:   internal hemorrhoids and sigmoid diverticulosis        Greater than 6 minutes was spent withdrawing the colonoscope.       IMPRESSION/PLAN:   Increase dietary fiber and water  Patient is advised to have a repeat colonoscopy in 7 years unless otherwise dictated by pathology  Colonoscopy sooner if blood in the stool, unexplained weight loss, or change in the bowels

## 2022-12-09 NOTE — H&P
[address]      Patient's Name/ Date of Birth/ Gender: Tigre Watkins / 1975 (54 y.o.) / male     Attending physician: Thom Dejesus DO    CC: colorectal cancer screening    History of present Illness: Tigre Watkins is a 50 y.o. male, presents today for colonoscopy for colon cancer screening. Colonoscopy history: No prior cscope. Past Medical History:  has a past medical history of Anxiety, Depression, Left flank pain, Ureteral calculus, left, and Wellness examination. Past Surgical History:   Past Surgical History:   Procedure Laterality Date    URETER SURGERY Left 8/27/2021    HOLMIUM, CYSTOSCOPY, URETEROSCOPY, STENT PLACEMENT performed by Jose Miguel Jauregui MD at [place] [address]    URETEROSCOPY Left 08/27/2021    stent placement. cystoscopy     WISDOM TOOTH EXTRACTION         Social History:  reports that he has never smoked. He has never used smokeless tobacco. He reports that he does not currently use alcohol. He reports that he does not use drugs. Family History: family history includes High Blood Pressure in his mother; No Known Problems in his father. Review of Systems:   General: Denies fever, chills, night sweats, weight loss, malaise, fatigue  HEENT: Denies sore throat, sinus problems, allergic rhinosinusitis  Card: Denies chest pain, palpitations, orthopnea/PND. Denies h/o murmurs  Pulm: Denies cough, shortness of breath, MORAN  GI:  per HPI; denies history of constipation, diarrhea, hematochezia or melena  : Denies polyuria, dysuria, hematuria  Endo: Denies diabetes, thyroid problems. Heme: Denies anemia, h/o bleeding or clotting problems. Neuro: Denies h/o CVA, TIA  Skin: Denies rashes, ulcers  Musculoskeletal: Denies muscle, joint, back pain.     Allergies: Bactrim [sulfamethoxazole-trimethoprim]    Current Meds:  Current Facility-Administered Medications:     0.9 % sodium chloride infusion, , IntraVENous, Continuous, MD denise Zhou ringers infusion, , IntraVENous, Continuous, Ki Hugo MD    sodium chloride flush 0.9 % injection 5-40 mL, 5-40 mL, IntraVENous, 2 times per day, Ki Hugo MD    sodium chloride flush 0.9 % injection 5-40 mL, 5-40 mL, IntraVENous, PRN, Ki Hugo MD    0.9 % sodium chloride infusion, , IntraVENous, PRN, Ki Hugo MD    Physical Exam:  Vital signs and Nurse's note reviewed  Gen:  A&Ox3, NAD  HEENT: NCAT, PERRLA, EOMI, no scleral icterus, oral mucosa moist  Neck: Trachea midline without obvious masses or lesions  Chest: Symmetric rise with inhalation, no evidence of trauma  CVS: S1S2  Resp: Good bilateral air entry, no audible wheeze or rhonchi  Abd: soft, non-tender, non-distended, no hepatosplenomegaly or palpable masses  Ext: No clubbing, cyanosis, edema, peripheral pulses 2+ Rad/Fem/DP/PT  CNS: Moves all extremities, no gross focal motor deficits  Skin: No erythema or ulcerations         Assessment:    Dina Piper is a 50 y.o. male presents for colonoscopy for colon cancer screening. Plan: To OR for colonoscopy. The risks include bleeding, infection, scarring, perforation, damage to surrounding tissues, need for further surgery in the future. The benefits, alternatives, complications and procedure details were explained to the patient, all questions were answered.           Keren Sanchez DO  12/9/2022

## 2022-12-09 NOTE — ANESTHESIA POSTPROCEDURE EVALUATION
POST- ANESTHESIA EVALUATION       Pt Name: Sary Koroma  MRN: [id number]  YOB: 1975  Date of evaluation: 12/9/2022  Time:  11:12 AM      BP (!) 128/93   Pulse 64   Temp (!) 96.5 °F (35.8 °C) (Temporal)   Resp 15   Ht 6' (1.829 m)   Wt 248 lb (112.5 kg)   SpO2 100%   BMI 33.63 kg/m²      Consciousness Level  Awake  Cardiopulmonary Status  Stable  Pain Adequately Treated YES  Nausea / Vomiting  NO  Adequate Hydration  YES  Anesthesia Related Complications NONE      Electronically signed by Brannon Vicente MD on 12/9/2022 at 11:12 AM       Department of Anesthesiology  Postprocedure Note    Patient: Sary Koroma  MRN: [id number]  [place]: 1975  Date of evaluation: 12/9/2022      Procedure Summary     Date: 12/09/22 Room / Location: [address]) [facility]    Anesthesia Start: 7395 Anesthesia Stop: 1163    Procedure: COLONOSCOPY WITH DESCENDING COLON POLYPECTOMIES Diagnosis:       Screening for colon cancer      (Screening for colon cancer [Z12.11])    Surgeons: Jonathan Means DO Responsible Provider: Brannon Vicente MD    Anesthesia Type: MAC ASA Status: 2          Anesthesia Type: No value filed.     Chantell Phase I: Chantell Score: 10    Chantell Phase II: Chantell Score: 10      Anesthesia Post Evaluation

## 2022-12-09 NOTE — ANESTHESIA PRE PROCEDURE
Department of Anesthesiology  Preprocedure Note       Name:  Oscar Coffman   Age:  50 y.o.  :  1975                                          MRN:  [id number]         Date:  2022      Surgeon: Serenity Coelho):  Veronica Boles DO    Procedure: Procedure(s):  COLORECTAL CANCER SCREENING, NOT HIGH RISK    Medications prior to admission:   Prior to Admission medications    Medication Sig Start Date End Date Taking? Authorizing Provider   citalopram (CELEXA) 40 MG tablet TAKE 1 TABLET DAILY 10/20/22   Abbi Novak MD       Current medications:    Current Facility-Administered Medications   Medication Dose Route Frequency Provider Last Rate Last Admin    0.9 % sodium chloride infusion   IntraVENous Continuous Ange Michel MD        lactated ringers infusion   IntraVENous Continuous Ange Michel MD        sodium chloride flush 0.9 % injection 5-40 mL  5-40 mL IntraVENous 2 times per day Ange Michel MD        sodium chloride flush 0.9 % injection 5-40 mL  5-40 mL IntraVENous PRN Ange Michel MD        0.9 % sodium chloride infusion   IntraVENous PRN Ange Michel MD           Allergies: Allergies   Allergen Reactions    Bactrim [Sulfamethoxazole-Trimethoprim] Itching and Swelling       Problem List:    Patient Active Problem List   Diagnosis Code    Prostatitis N41.9    Anxiety F41.9    Primary insomnia F51.01    Panic attacks F41.0       Past Medical History:        Diagnosis Date    Anxiety     Depression     Left flank pain     Ureteral calculus, left     Wellness examination 2021    Dr. Arline Cooks       Past Surgical History:        Procedure Laterality Date    URETER SURGERY Left 2021    HOLMIUM, CYSTOSCOPY, URETEROSCOPY, STENT PLACEMENT performed by Julius Dejesus MD at [address] [place] [address] URETEROSCOPY Left 2021    stent placement.  cystoscopy     WISDOM TOOTH EXTRACTION         Social History:    Social History     Tobacco Use    Smoking status: Never    Smokeless tobacco: Never PA was faxed to [facility] for review and determination.   Substance Use Topics    Alcohol use: Not Currently     Alcohol/week: 0.0 standard drinks                                Counseling given: Not Answered      Vital Signs (Current):   Vitals:    12/09/22 0827   Temp: 97.1 °F (36.2 °C)   TempSrc: Infrared   Weight: 248 lb (112.5 kg)   Height: 6' (1.829 m)                                              BP Readings from Last 3 Encounters:   10/21/22 (!) 131/101   08/16/22 124/82   01/14/22 118/82       NPO Status: Time of last liquid consumption: 2200                        Time of last solid consumption: 1800                        Date of last liquid consumption: 12/08/22                        Date of last solid food consumption: 12/07/22    BMI:   Wt Readings from Last 3 Encounters:   12/09/22 248 lb (112.5 kg)   10/21/22 249 lb (112.9 kg)   08/16/22 241 lb 3.2 oz (109.4 kg)     Body mass index is 33.63 kg/m². CBC:   Lab Results   Component Value Date/Time    WBC 12.4 08/23/2021 03:15 PM    RBC 5.00 08/23/2021 03:15 PM    HGB 14.7 08/23/2021 03:15 PM    HCT 44.6 08/23/2021 03:15 PM    MCV 89.2 08/23/2021 03:15 PM    RDW 13.9 08/23/2021 03:15 PM     08/23/2021 03:15 PM       CMP:   Lab Results   Component Value Date/Time     08/19/2022 08:36 AM    K 4.2 08/19/2022 08:36 AM     08/19/2022 08:36 AM    CO2 25 08/19/2022 08:36 AM    BUN 11 08/19/2022 08:36 AM    CREATININE 1.00 08/19/2022 08:36 AM    GFRAA >60 08/19/2022 08:36 AM    LABGLOM >60 08/19/2022 08:36 AM    GLUCOSE 109 08/23/2021 03:15 PM    PROT 7.5 08/23/2021 03:15 PM    CALCIUM 9.2 08/19/2022 08:36 AM    BILITOT 0.55 08/23/2021 03:15 PM    ALKPHOS 73 08/23/2021 03:15 PM    AST 23 08/23/2021 03:15 PM    ALT 30 08/23/2021 03:15 PM       POC Tests: No results for input(s): POCGLU, POCNA, POCK, POCCL, POCBUN, POCHEMO, POCHCT in the last 72 hours.     Coags: No results found for: PROTIME, INR, APTT    HCG (If Applicable): No results found for: PREGTESTUR, PREGSERUM, HCG, HCGQUANT     ABGs: No results found for: PHART, PO2ART, GAQ0BOU, XEV6PIG, BEART, Z0OWKSQO     Type & Screen (If Applicable):  No results found for: LABABO, LABRH    Drug/Infectious Status (If Applicable):  No results found for: HIV, HEPCAB    COVID-19 Screening (If Applicable): No results found for: COVID19        Anesthesia Evaluation  Patient summary reviewed and Nursing notes reviewed no history of anesthetic complications:   Airway: Mallampati: II  TM distance: >3 FB   Neck ROM: full  Mouth opening: > = 3 FB   Dental: normal exam         Pulmonary:Negative Pulmonary ROS and normal exam  breath sounds clear to auscultation                             Cardiovascular:Negative CV ROS            Rhythm: regular  Rate: normal                    Neuro/Psych:   (+) psychiatric history:depression/anxiety             GI/Hepatic/Renal:   (+) bowel prep,          ROS comment: obesity. Endo/Other: Negative Endo/Other ROS                    Abdominal:       Abdomen: soft. Vascular: negative vascular ROS. Other Findings:           Anesthesia Plan      MAC     ASA 2             Anesthetic plan and risks discussed with patient. Plan discussed with CRNA.                     Daina Arauz MD   12/9/2022

## 2022-12-12 LAB — SURGICAL PATHOLOGY REPORT: NORMAL

## 2023-02-17 ENCOUNTER — OFFICE VISIT (OUTPATIENT)
Dept: PRIMARY CARE CLINIC | Age: 48
End: 2023-02-17

## 2023-02-17 VITALS
OXYGEN SATURATION: 98 % | SYSTOLIC BLOOD PRESSURE: 126 MMHG | WEIGHT: 255.4 LBS | HEIGHT: 72 IN | DIASTOLIC BLOOD PRESSURE: 88 MMHG | BODY MASS INDEX: 34.59 KG/M2 | HEART RATE: 89 BPM

## 2023-02-17 DIAGNOSIS — N52.9 ERECTILE DYSFUNCTION, UNSPECIFIED ERECTILE DYSFUNCTION TYPE: ICD-10-CM

## 2023-02-17 DIAGNOSIS — F41.9 ANXIETY: ICD-10-CM

## 2023-02-17 DIAGNOSIS — Z79.899 MEDICATION MANAGEMENT: Primary | ICD-10-CM

## 2023-02-17 DIAGNOSIS — G47.9 SLEEP DISORDER: ICD-10-CM

## 2023-02-17 RX ORDER — SILDENAFIL 100 MG/1
100 TABLET, FILM COATED ORAL DAILY PRN
Qty: 10 TABLET | Refills: 5 | Status: SHIPPED | OUTPATIENT
Start: 2023-02-17

## 2023-02-17 SDOH — ECONOMIC STABILITY: INCOME INSECURITY: HOW HARD IS IT FOR YOU TO PAY FOR THE VERY BASICS LIKE FOOD, HOUSING, MEDICAL CARE, AND HEATING?: NOT HARD AT ALL

## 2023-02-17 SDOH — ECONOMIC STABILITY: FOOD INSECURITY: WITHIN THE PAST 12 MONTHS, THE FOOD YOU BOUGHT JUST DIDN'T LAST AND YOU DIDN'T HAVE MONEY TO GET MORE.: NEVER TRUE

## 2023-02-17 SDOH — ECONOMIC STABILITY: FOOD INSECURITY: WITHIN THE PAST 12 MONTHS, YOU WORRIED THAT YOUR FOOD WOULD RUN OUT BEFORE YOU GOT MONEY TO BUY MORE.: NEVER TRUE

## 2023-02-17 SDOH — ECONOMIC STABILITY: HOUSING INSECURITY
IN THE LAST 12 MONTHS, WAS THERE A TIME WHEN YOU DID NOT HAVE A STEADY PLACE TO SLEEP OR SLEPT IN A SHELTER (INCLUDING NOW)?: NO

## 2023-02-17 ASSESSMENT — PATIENT HEALTH QUESTIONNAIRE - PHQ9
10. IF YOU CHECKED OFF ANY PROBLEMS, HOW DIFFICULT HAVE THESE PROBLEMS MADE IT FOR YOU TO DO YOUR WORK, TAKE CARE OF THINGS AT HOME, OR GET ALONG WITH OTHER PEOPLE: 0
1. LITTLE INTEREST OR PLEASURE IN DOING THINGS: 0
9. THOUGHTS THAT YOU WOULD BE BETTER OFF DEAD, OR OF HURTING YOURSELF: 0
5. POOR APPETITE OR OVEREATING: 0
SUM OF ALL RESPONSES TO PHQ QUESTIONS 1-9: 0
SUM OF ALL RESPONSES TO PHQ9 QUESTIONS 1 & 2: 0
6. FEELING BAD ABOUT YOURSELF - OR THAT YOU ARE A FAILURE OR HAVE LET YOURSELF OR YOUR FAMILY DOWN: 0
SUM OF ALL RESPONSES TO PHQ QUESTIONS 1-9: 0
7. TROUBLE CONCENTRATING ON THINGS, SUCH AS READING THE NEWSPAPER OR WATCHING TELEVISION: 0
SUM OF ALL RESPONSES TO PHQ QUESTIONS 1-9: 0
SUM OF ALL RESPONSES TO PHQ QUESTIONS 1-9: 0
3. TROUBLE FALLING OR STAYING ASLEEP: 0
4. FEELING TIRED OR HAVING LITTLE ENERGY: 0
8. MOVING OR SPEAKING SO SLOWLY THAT OTHER PEOPLE COULD HAVE NOTICED. OR THE OPPOSITE, BEING SO FIGETY OR RESTLESS THAT YOU HAVE BEEN MOVING AROUND A LOT MORE THAN USUAL: 0
2. FEELING DOWN, DEPRESSED OR HOPELESS: 0

## 2023-02-17 NOTE — PROGRESS NOTES
[facility] PRIMARY CARE  [address] [address] [facility] [address]  Dept: Grecia Kessler is a 50 y.o. male Established patient, who presents today for his medical conditions/complaintsas noted below. Chief Complaint   Patient presents with    Anxiety     6 month f/u    Immunizations     Declined pfizer and flu       HPI:     HPI  Anxiety is better  No depression  No panic attacks    Snoring, not breathing in the night per spouse  Tired in am and during the day. Problems x 2 years. Reviewed prior notes None  Reviewed previous Labs  Gained weight, was 241 last August    LDL Cholesterol (mg/dL)   Date Value   07/09/2021 113       (goal LDL is <100)   AST (U/L)   Date Value   08/23/2021 23     ALT (U/L)   Date Value   08/23/2021 30     BUN (mg/dL)   Date Value   08/19/2022 11     BP Readings from Last 3 Encounters:   02/17/23 126/88   12/09/22 (!) 128/93   10/21/22 (!) 131/101          (goal 120/80)    Past Medical History:   Diagnosis Date    Anxiety     Depression     Left flank pain     Ureteral calculus, left     Wellness examination 07/02/2021    Dr. Simon Wells      Past Surgical History:   Procedure Laterality Date    COLONOSCOPY  12/09/2022    COLONOSCOPY N/A 12/9/2022    COLONOSCOPY WITH DESCENDING COLON POLYPECTOMIES performed by Toni Shi DO at [address] Left 08/27/2021    HOLMIUM, CYSTOSCOPY, URETEROSCOPY, STENT PLACEMENT performed by Sedrick Tidwell MD at [place] [address]    URETEROSCOPY Left 08/27/2021    stent placement.  cystoscopy     WISDOM TOOTH EXTRACTION         Family History   Problem Relation Age of Onset    High Blood Pressure Mother     No Known Problems Father        Social History     Tobacco Use    Smoking status: Never    Smokeless tobacco: Never   Substance Use Topics    Alcohol use: Not Currently     Alcohol/week: 0.0 standard drinks      Current Outpatient Medications   Medication Sig Dispense Refill    sildenafil (VIAGRA) 100 MG tablet Take 1 tablet by mouth daily as needed for Erectile Dysfunction 10 tablet 5    citalopram (CELEXA) 40 MG tablet TAKE 1 TABLET DAILY 90 tablet 1     No current facility-administered medications for this visit. Allergies   Allergen Reactions    Bactrim [Sulfamethoxazole-Trimethoprim] Itching and Swelling       Health Maintenance   Topic Date Due    HIV screen  Never done    Hepatitis C screen  Never done    COVID-19 Vaccine (2 - Booster for Alia series) 07/09/2021    Flu vaccine (1) 08/01/2022    Depression Monitoring  08/16/2023    Diabetes screen  08/19/2025    DTaP/Tdap/Td vaccine (2 - Td or Tdap) 03/22/2026    Lipids  07/09/2026    Colorectal Cancer Screen  12/09/2032    Hepatitis A vaccine  Aged Out    Hib vaccine  Aged Out    Meningococcal (ACWY) vaccine  Aged Out    Pneumococcal 0-64 years Vaccine  Aged Out       Subjective:      Review of Systems   Constitutional:  Positive for fatigue. Objective:     /88   Pulse 89   Ht 6' (1.829 m)   Wt 255 lb 6.4 oz (115.8 kg)   SpO2 98%   BMI 34.64 kg/m²   Physical Exam  Vitals and nursing note reviewed. Constitutional:       General: He is not in acute distress. Appearance: He is well-developed. He is not ill-appearing. HENT:      Head: Normocephalic and atraumatic. Right Ear: External ear normal.      Left Ear: External ear normal.      Mouth/Throat:      Mouth: Mucous membranes are moist.      Pharynx: No posterior oropharyngeal erythema. Comments: Small pharyngeal opening  Eyes:      General: No scleral icterus. Right eye: No discharge. Left eye: No discharge. Conjunctiva/sclera: Conjunctivae normal.   Neck:      Thyroid: No thyromegaly. Trachea: No tracheal deviation. Comments: 18.5 inch  neck circumference. Cardiovascular:      Rate and Rhythm: Normal rate and regular rhythm. Heart sounds: Normal heart sounds.    Pulmonary:      Effort: Pulmonary effort is normal. No respiratory distress. Breath sounds: Normal breath sounds. No wheezing. Lymphadenopathy:      Cervical: No cervical adenopathy. Skin:     General: Skin is warm. Findings: No rash. Neurological:      Mental Status: He is alert and oriented to person, place, and time. Psychiatric:         Mood and Affect: Mood normal.         Behavior: Behavior normal.         Thought Content: Thought content normal.       Assessment:       Diagnosis Orders   1. Medication management        2. Sleep disorder  Sleep Study with PAP Titration      3. Anxiety        4. Erectile dysfunction, unspecified erectile dysfunction type  sildenafil (VIAGRA) 100 MG tablet           Plan:      Return in about 3 months (around 5/17/2023) for OV 1-3 months if he gets sleep apnea machine. .  + Stop bang questionairre  Reviewed possible SE    Orders Placed This Encounter   Procedures    Sleep Study with PAP Titration     Standing Status:   Future     Standing Expiration Date:   6/17/2023     Order Specific Question:   Sleep Study Titration Type     Answer:   Split Night Study (Baseline followed by PAP Titration)     Order Specific Question:   Location For Sleep Study     Answer: [facility] Specific Question:   Select Sleep Lab Location     Answer:   [address]     Order Specific Question:   Pre-Study Patient Questions: Answer:   Complains of daytime sleepiness     Order Specific Question:   Pre-Study Patient Questions: Answer: Others have witnessed episodes of apnea while the patient sleeps     Order Specific Question:   Pre-Study Patient Questions: Answer:   Snores loudly during sleep       Orders Placed This Encounter   Medications    sildenafil (VIAGRA) 100 MG tablet     Sig: Take 1 tablet by mouth daily as needed for Erectile Dysfunction     Dispense:  10 tablet     Refill:  5         Patient given educationalmaterials - see patient instructions.   Discussed use, benefit, and side effectsof prescribed medications. All patient questions answered. Pt voiced understanding. Reviewed health maintenance. Instructed to continue current medications, diet andexercise. Patient agreed with treatment plan. Follow up as directed.      Electronicallysigned by Reggie Miller MD on 2/17/2023 at 1:36 PM

## 2023-04-18 DIAGNOSIS — F41.9 ANXIETY AND DEPRESSION: ICD-10-CM

## 2023-04-18 DIAGNOSIS — F41.0 PANIC ATTACKS: ICD-10-CM

## 2023-04-18 DIAGNOSIS — F32.A ANXIETY AND DEPRESSION: ICD-10-CM

## 2023-04-18 RX ORDER — CITALOPRAM 40 MG/1
TABLET ORAL
Qty: 90 TABLET | Refills: 1 | Status: SHIPPED | OUTPATIENT
Start: 2023-04-18

## 2023-04-18 NOTE — TELEPHONE ENCOUNTER
LAST VISIT:   2/17/2023     Future Appointments   Date Time Provider Lianna Kay   8/18/2023  1:20 PM Jo-Ann Baker MD [id number]

## 2023-08-22 ENCOUNTER — HOSPITAL ENCOUNTER (OUTPATIENT)
Age: 48
Discharge: HOME | End: 2023-08-22

## 2023-08-22 LAB
ALBUMIN SERPL-MCNC: 4.8 G/DL (ref 3.5–5.2)
ALBUMIN/GLOB SERPL: 2 {RATIO} (ref 1–2.5)
ALP SERPL-CCNC: 77 U/L (ref 40–129)
ALT SERPL-CCNC: 35 U/L (ref 5–41)
ANION GAP SERPL CALCULATED.3IONS-SCNC: 17 MMOL/L (ref 9–17)
AST SERPL-CCNC: 27 U/L
BASOPHILS # BLD: 0.04 K/UL (ref 0–0.2)
BASOPHILS NFR BLD: 1 % (ref 0–2)
BILIRUB SERPL-MCNC: 0.4 MG/DL (ref 0.3–1.2)
BILIRUB UR QL STRIP: NEGATIVE
BUN SERPL-MCNC: 10 MG/DL (ref 6–20)
CALCIUM SERPL-MCNC: 9.4 MG/DL (ref 8.6–10.4)
CASTS #/AREA URNS LPF: NORMAL /LPF (ref 0–8)
CHLORIDE SERPL-SCNC: 101 MMOL/L (ref 98–107)
CHOLEST SERPL-MCNC: 239 MG/DL
CHOLESTEROL/HDL RATIO: 6.3
CLARITY UR: CLEAR
CO2 SERPL-SCNC: 22 MMOL/L (ref 20–31)
COLOR UR: YELLOW
CREAT SERPL-MCNC: 1.1 MG/DL (ref 0.7–1.2)
EOSINOPHIL # BLD: 0.08 K/UL (ref 0–0.44)
EOSINOPHILS RELATIVE PERCENT: 1 % (ref 1–4)
EPI CELLS #/AREA URNS HPF: NORMAL /HPF (ref 0–5)
ERYTHROCYTE [DISTWIDTH] IN BLOOD BY AUTOMATED COUNT: 13.9 % (ref 11.8–14.4)
GFR SERPL CREATININE-BSD FRML MDRD: >60 ML/MIN/1.73M2
GLUCOSE SERPL-MCNC: 83 MG/DL (ref 70–99)
GLUCOSE UR STRIP-MCNC: NEGATIVE MG/DL
HCT VFR BLD AUTO: 47.3 % (ref 40.7–50.3)
HDLC SERPL-MCNC: 38 MG/DL
HGB BLD-MCNC: 15.7 G/DL (ref 13–17)
HGB UR QL STRIP.AUTO: NEGATIVE
IMM GRANULOCYTES # BLD AUTO: <0.03 K/UL (ref 0–0.3)
IMM GRANULOCYTES NFR BLD: 0 %
IRON SERPL-MCNC: 113 UG/DL (ref 59–158)
KETONES UR STRIP-MCNC: NEGATIVE MG/DL
LDH SERPL-CCNC: 204 U/L (ref 135–225)
LDLC SERPL CALC-MCNC: 139 MG/DL (ref 0–130)
LEUKOCYTE ESTERASE UR QL STRIP: NEGATIVE
LYMPHOCYTES NFR BLD: 2.43 K/UL (ref 1.1–3.7)
LYMPHOCYTES RELATIVE PERCENT: 36 % (ref 24–43)
MCH RBC QN AUTO: 30.2 PG (ref 25.2–33.5)
MCHC RBC AUTO-ENTMCNC: 33.2 G/DL (ref 28.4–34.8)
MCV RBC AUTO: 91 FL (ref 82.6–102.9)
MONOCYTES NFR BLD: 0.59 K/UL (ref 0.1–1.2)
MONOCYTES NFR BLD: 9 % (ref 3–12)
NEUTROPHILS NFR BLD: 53 % (ref 36–65)
NEUTS SEG NFR BLD: 3.59 K/UL (ref 1.5–8.1)
NITRITE UR QL STRIP: NEGATIVE
NRBC BLD-RTO: 0 PER 100 WBC
PH UR STRIP: 6 [PH] (ref 5–8)
PHOSPHATE SERPL-MCNC: 2.4 MG/DL (ref 2.5–4.5)
PLATELET # BLD AUTO: 246 K/UL (ref 138–453)
PMV BLD AUTO: 9.3 FL (ref 8.1–13.5)
POTASSIUM SERPL-SCNC: 4.2 MMOL/L (ref 3.7–5.3)
PROT SERPL-MCNC: 7.2 G/DL (ref 6.4–8.3)
PROT UR STRIP-MCNC: NEGATIVE MG/DL
RBC # BLD AUTO: 5.2 M/UL (ref 4.21–5.77)
RBC #/AREA URNS HPF: NORMAL /HPF (ref 0–4)
SODIUM SERPL-SCNC: 140 MMOL/L (ref 135–144)
SP GR UR STRIP: 1.02 (ref 1–1.03)
TRIGL SERPL-MCNC: 312 MG/DL
URATE SERPL-MCNC: 6.2 MG/DL (ref 3.4–7)
UROBILINOGEN UR STRIP-ACNC: NORMAL EU/DL (ref 0–1)
WBC #/AREA URNS HPF: NORMAL /HPF (ref 0–5)
WBC OTHER # BLD: 6.7 K/UL (ref 3.5–11.3)

## 2023-08-22 PROCEDURE — 84550 ASSAY OF BLOOD/URIC ACID: CPT

## 2023-08-22 PROCEDURE — 83655 ASSAY OF LEAD: CPT

## 2023-08-22 PROCEDURE — 80061 LIPID PANEL: CPT

## 2023-08-22 PROCEDURE — 83615 LACTATE (LD) (LDH) ENZYME: CPT

## 2023-08-22 PROCEDURE — 83540 ASSAY OF IRON: CPT

## 2023-08-22 PROCEDURE — 84100 ASSAY OF PHOSPHORUS: CPT

## 2023-08-22 PROCEDURE — 85025 COMPLETE CBC W/AUTO DIFF WBC: CPT

## 2023-08-22 PROCEDURE — 80053 COMPREHEN METABOLIC PANEL: CPT

## 2023-08-22 PROCEDURE — 81001 URINALYSIS AUTO W/SCOPE: CPT

## 2023-08-22 PROCEDURE — 84202 ASSAY RBC PROTOPORPHYRIN: CPT

## 2023-08-23 LAB — LEAD RBC-MCNC: 12 UG/DL (ref 0–4)

## 2023-08-25 LAB — ZPP TO HEME RATIO: 31 UMOLZPP/MOLHEM (ref 0–69)

## 2023-10-08 DIAGNOSIS — F32.A ANXIETY AND DEPRESSION: ICD-10-CM

## 2023-10-08 DIAGNOSIS — F41.0 PANIC ATTACKS: ICD-10-CM

## 2023-10-08 DIAGNOSIS — F41.9 ANXIETY AND DEPRESSION: ICD-10-CM

## 2023-10-09 NOTE — TELEPHONE ENCOUNTER
LAST VISIT:   2/17/2023     Future Appointments   Date Time Provider [address]   10/27/2023  8:20 AM MD IVAN Aguilar

## 2023-10-10 RX ORDER — CITALOPRAM 40 MG/1
TABLET ORAL
Qty: 90 TABLET | Refills: 1 | Status: SHIPPED | OUTPATIENT
Start: 2023-10-10

## 2023-10-27 ENCOUNTER — OFFICE VISIT (OUTPATIENT)
Dept: PRIMARY CARE CLINIC | Age: 48
End: 2023-10-27
Payer: COMMERCIAL

## 2023-10-27 VITALS
HEART RATE: 69 BPM | BODY MASS INDEX: 34.7 KG/M2 | WEIGHT: 256.2 LBS | DIASTOLIC BLOOD PRESSURE: 86 MMHG | HEIGHT: 72 IN | OXYGEN SATURATION: 97 % | SYSTOLIC BLOOD PRESSURE: 116 MMHG

## 2023-10-27 DIAGNOSIS — F32.A ANXIETY AND DEPRESSION: ICD-10-CM

## 2023-10-27 DIAGNOSIS — Z79.899 MEDICATION MANAGEMENT: Primary | ICD-10-CM

## 2023-10-27 DIAGNOSIS — F41.9 ANXIETY AND DEPRESSION: ICD-10-CM

## 2023-10-27 DIAGNOSIS — N52.9 ERECTILE DYSFUNCTION, UNSPECIFIED ERECTILE DYSFUNCTION TYPE: ICD-10-CM

## 2023-10-27 PROCEDURE — 99213 OFFICE O/P EST LOW 20 MIN: CPT | Performed by: FAMILY MEDICINE

## 2023-10-27 RX ORDER — SILDENAFIL 100 MG/1
100 TABLET, FILM COATED ORAL DAILY PRN
Qty: 10 TABLET | Refills: 5 | Status: SHIPPED | OUTPATIENT
Start: 2023-10-27

## 2023-10-27 ASSESSMENT — ENCOUNTER SYMPTOMS: RESPIRATORY NEGATIVE: 1

## 2023-12-27 ENCOUNTER — OFFICE VISIT (OUTPATIENT)
Dept: PRIMARY CARE CLINIC | Age: 48
End: 2023-12-27
Payer: COMMERCIAL

## 2023-12-27 VITALS
OXYGEN SATURATION: 96 % | HEART RATE: 83 BPM | WEIGHT: 260 LBS | TEMPERATURE: 97.5 F | HEIGHT: 72 IN | SYSTOLIC BLOOD PRESSURE: 116 MMHG | DIASTOLIC BLOOD PRESSURE: 78 MMHG | BODY MASS INDEX: 35.21 KG/M2

## 2023-12-27 DIAGNOSIS — J01.90 ACUTE SINUSITIS, RECURRENCE NOT SPECIFIED, UNSPECIFIED LOCATION: Primary | ICD-10-CM

## 2023-12-27 PROCEDURE — 99213 OFFICE O/P EST LOW 20 MIN: CPT | Performed by: FAMILY MEDICINE

## 2023-12-27 RX ORDER — AMOXICILLIN 500 MG/1
1000 CAPSULE ORAL 2 TIMES DAILY
Qty: 40 CAPSULE | Refills: 0 | Status: SHIPPED | OUTPATIENT
Start: 2023-12-27 | End: 2024-01-06

## 2024-04-05 DIAGNOSIS — F32.A ANXIETY AND DEPRESSION: ICD-10-CM

## 2024-04-05 DIAGNOSIS — F41.9 ANXIETY AND DEPRESSION: ICD-10-CM

## 2024-04-05 DIAGNOSIS — F41.0 PANIC ATTACKS: ICD-10-CM

## 2024-04-05 RX ORDER — CITALOPRAM 40 MG/1
TABLET ORAL
Qty: 90 TABLET | Refills: 1 | Status: SHIPPED | OUTPATIENT
Start: 2024-04-05

## 2024-10-02 DIAGNOSIS — F41.0 PANIC ATTACKS: ICD-10-CM

## 2024-10-02 DIAGNOSIS — F32.A ANXIETY AND DEPRESSION: ICD-10-CM

## 2024-10-02 DIAGNOSIS — F41.9 ANXIETY AND DEPRESSION: ICD-10-CM

## 2024-10-02 RX ORDER — CITALOPRAM HYDROBROMIDE 40 MG/1
TABLET ORAL
Qty: 90 TABLET | Refills: 0 | Status: SHIPPED | OUTPATIENT
Start: 2024-10-02

## 2024-10-11 ASSESSMENT — PATIENT HEALTH QUESTIONNAIRE - PHQ9
6. FEELING BAD ABOUT YOURSELF - OR THAT YOU ARE A FAILURE OR HAVE LET YOURSELF OR YOUR FAMILY DOWN: NOT AT ALL
10. IF YOU CHECKED OFF ANY PROBLEMS, HOW DIFFICULT HAVE THESE PROBLEMS MADE IT FOR YOU TO DO YOUR WORK, TAKE CARE OF THINGS AT HOME, OR GET ALONG WITH OTHER PEOPLE: NOT DIFFICULT AT ALL
3. TROUBLE FALLING OR STAYING ASLEEP: NOT AT ALL
2. FEELING DOWN, DEPRESSED OR HOPELESS: NOT AT ALL
SUM OF ALL RESPONSES TO PHQ QUESTIONS 1-9: 0
5. POOR APPETITE OR OVEREATING: NOT AT ALL
5. POOR APPETITE OR OVEREATING: NOT AT ALL
SUM OF ALL RESPONSES TO PHQ QUESTIONS 1-9: 0
8. MOVING OR SPEAKING SO SLOWLY THAT OTHER PEOPLE COULD HAVE NOTICED. OR THE OPPOSITE - BEING SO FIDGETY OR RESTLESS THAT YOU HAVE BEEN MOVING AROUND A LOT MORE THAN USUAL: NOT AT ALL
3. TROUBLE FALLING OR STAYING ASLEEP: NOT AT ALL
9. THOUGHTS THAT YOU WOULD BE BETTER OFF DEAD, OR OF HURTING YOURSELF: NOT AT ALL
4. FEELING TIRED OR HAVING LITTLE ENERGY: NOT AT ALL
6. FEELING BAD ABOUT YOURSELF - OR THAT YOU ARE A FAILURE OR HAVE LET YOURSELF OR YOUR FAMILY DOWN: NOT AT ALL
7. TROUBLE CONCENTRATING ON THINGS, SUCH AS READING THE NEWSPAPER OR WATCHING TELEVISION: NOT AT ALL
2. FEELING DOWN, DEPRESSED OR HOPELESS: NOT AT ALL
SUM OF ALL RESPONSES TO PHQ QUESTIONS 1-9: 0
10. IF YOU CHECKED OFF ANY PROBLEMS, HOW DIFFICULT HAVE THESE PROBLEMS MADE IT FOR YOU TO DO YOUR WORK, TAKE CARE OF THINGS AT HOME, OR GET ALONG WITH OTHER PEOPLE: NOT DIFFICULT AT ALL
SUM OF ALL RESPONSES TO PHQ QUESTIONS 1-9: 0
7. TROUBLE CONCENTRATING ON THINGS, SUCH AS READING THE NEWSPAPER OR WATCHING TELEVISION: NOT AT ALL
SUM OF ALL RESPONSES TO PHQ9 QUESTIONS 1 & 2: 0
SUM OF ALL RESPONSES TO PHQ QUESTIONS 1-9: 0
1. LITTLE INTEREST OR PLEASURE IN DOING THINGS: NOT AT ALL
4. FEELING TIRED OR HAVING LITTLE ENERGY: NOT AT ALL
9. THOUGHTS THAT YOU WOULD BE BETTER OFF DEAD, OR OF HURTING YOURSELF: NOT AT ALL
8. MOVING OR SPEAKING SO SLOWLY THAT OTHER PEOPLE COULD HAVE NOTICED. OR THE OPPOSITE, BEING SO FIGETY OR RESTLESS THAT YOU HAVE BEEN MOVING AROUND A LOT MORE THAN USUAL: NOT AT ALL
1. LITTLE INTEREST OR PLEASURE IN DOING THINGS: NOT AT ALL

## 2024-10-15 SDOH — ECONOMIC STABILITY: INCOME INSECURITY: HOW HARD IS IT FOR YOU TO PAY FOR THE VERY BASICS LIKE FOOD, HOUSING, MEDICAL CARE, AND HEATING?: NOT HARD AT ALL

## 2024-10-15 SDOH — ECONOMIC STABILITY: FOOD INSECURITY: WITHIN THE PAST 12 MONTHS, THE FOOD YOU BOUGHT JUST DIDN'T LAST AND YOU DIDN'T HAVE MONEY TO GET MORE.: NEVER TRUE

## 2024-10-15 SDOH — ECONOMIC STABILITY: FOOD INSECURITY: WITHIN THE PAST 12 MONTHS, YOU WORRIED THAT YOUR FOOD WOULD RUN OUT BEFORE YOU GOT MONEY TO BUY MORE.: NEVER TRUE

## 2024-10-18 ENCOUNTER — OFFICE VISIT (OUTPATIENT)
Dept: PRIMARY CARE CLINIC | Age: 49
End: 2024-10-18

## 2024-10-18 VITALS
SYSTOLIC BLOOD PRESSURE: 130 MMHG | OXYGEN SATURATION: 98 % | DIASTOLIC BLOOD PRESSURE: 98 MMHG | HEIGHT: 72 IN | WEIGHT: 261.2 LBS | BODY MASS INDEX: 35.38 KG/M2 | HEART RATE: 65 BPM

## 2024-10-18 DIAGNOSIS — Z79.899 MEDICATION MANAGEMENT: Primary | ICD-10-CM

## 2024-10-18 DIAGNOSIS — F41.9 ANXIETY: ICD-10-CM

## 2024-10-18 DIAGNOSIS — R03.0 BORDERLINE HYPERTENSION: ICD-10-CM

## 2024-10-18 NOTE — PROGRESS NOTES
[facility] PHYSICIANS  [facility] PRIMARY CARE  [address] [address]  [place] [address]  Dept: [phone]    Kishore Day is a 49 y.o. male Established patient, who presents today for his medical conditions/complaintsas noted below.      Chief Complaint   Patient presents with    Medication Check       HPI:     HPI  Anxiety under good control  Allergies are bad right now  Eyes itching and irritated and watery, no sneezing  Taking claritin otc ( may be the D)    Reviewed prior notes None  Reviewed previous Labs    No components found for: \"LDLCHOLESTEROL\", \"LDLCALC\"    (goal LDL is <100)   AST (U/L)   Date Value   08/22/2023 27     ALT (U/L)   Date Value   08/22/2023 35     BUN (mg/dL)   Date Value   08/22/2023 10     BP Readings from Last 3 Encounters:   10/18/24 (!) 130/98   12/27/23 116/78   10/27/23 116/86          (goal 120/80)    Past Medical History:   Diagnosis Date    Anxiety     Depression     Left flank pain     Sleep apnea 4/1/2023    Ureteral calculus, left     Wellness examination 07/02/2021    Dr. Lili Alonso  [facility]      Past Surgical History:   Procedure Laterality Date    COLONOSCOPY  12/09/2022    COLONOSCOPY N/A 12/9/2022    COLONOSCOPY WITH DESCENDING COLON POLYPECTOMIES performed by Dudley Linares DO at [facility] OR    URETER SURGERY Left 08/27/2021    HOLMIUM, CYSTOSCOPY, URETEROSCOPY, STENT PLACEMENT performed by Tristan Szymanski MD at [facility] OR    URETEROSCOPY Left 08/27/2021    stent placement. cystoscopy     WISDOM TOOTH EXTRACTION         Family History   Problem Relation Age of Onset    High Blood Pressure Mother     Unknown Father        Social History     Tobacco Use    Smoking status: Never    Smokeless tobacco: Never   Substance Use Topics    Alcohol use: Never      Current Outpatient Medications   Medication Sig Dispense Refill    citalopram (CELEXA) 40 MG tablet TAKE 1 TABLET DAILY 90 tablet 0    sildenafil (VIAGRA) 100 MG tablet Take 1 tablet by

## 2024-10-18 NOTE — PATIENT INSTRUCTIONS
[email or web address] and enter H967 to learn more about \"DASH Diet: Care Instructions.\"  Current as of: September 20, 2023  Content Version: 14.2  © 2024 [employer].   Care instructions adapted under license by [employer]. If you have questions about a medical condition or this instruction, always ask your healthcare professional. [employer] disclaims any warranty or liability for your use of this information.       Patient Education        A Healthy Lifestyle: Care Instructions  A healthy lifestyle can help you feel good, have more energy, and stay at a weight that's healthy for you. You can share a healthy lifestyle with your friends and family. And you can do it on your own.    Eat meals with your friends or family. You could try cooking together.   Plan activities with other people. Go for a walk with a friend, try a free online fitness class, or join a sports league.     Eat a variety of healthy foods. These include fruits, vegetables, whole grains, low-fat dairy, and lean protein.   Choose healthy portions of food. You can use the Nutrition Facts label on food packages as a guide.     Eat more fruits and vegetables. You could add vegetables to sandwiches or add fruit to cereal.   Drink water when you are thirsty. Limit soda, juice, and sports drinks.     Try to exercise most days. Aim for at least 2½ hours of exercise each week.   Keep moving. Work in the garden or take your dog on a walk. Use the stairs instead of the elevator.     If you use tobacco or nicotine, try to quit. Ask your doctor about programs and medicines to help you quit.   Limit alcohol. Men should have no more than 2 drinks a day. Women should have no more than 1. For some people, no alcohol is the best choice.   Follow-up care is a key part of your treatment and safety. Be sure to make and go to all appointments, and call your doctor if you are having problems. It's also a good idea to know your

## 2024-12-24 DIAGNOSIS — F32.A ANXIETY AND DEPRESSION: ICD-10-CM

## 2024-12-24 DIAGNOSIS — F41.9 ANXIETY AND DEPRESSION: ICD-10-CM

## 2024-12-24 DIAGNOSIS — F41.0 PANIC ATTACKS: ICD-10-CM

## 2024-12-24 RX ORDER — CITALOPRAM HYDROBROMIDE 40 MG/1
TABLET ORAL
Qty: 90 TABLET | Refills: 0 | Status: SHIPPED | OUTPATIENT
Start: 2024-12-24

## 2025-03-24 DIAGNOSIS — F41.9 ANXIETY AND DEPRESSION: ICD-10-CM

## 2025-03-24 DIAGNOSIS — F32.A ANXIETY AND DEPRESSION: ICD-10-CM

## 2025-03-24 DIAGNOSIS — F41.0 PANIC ATTACKS: ICD-10-CM

## 2025-03-24 RX ORDER — CITALOPRAM HYDROBROMIDE 40 MG/1
40 TABLET ORAL DAILY
Qty: 90 TABLET | Refills: 3 | Status: SHIPPED | OUTPATIENT
Start: 2025-03-24

## 2025-04-15 SDOH — ECONOMIC STABILITY: FOOD INSECURITY: WITHIN THE PAST 12 MONTHS, YOU WORRIED THAT YOUR FOOD WOULD RUN OUT BEFORE YOU GOT MONEY TO BUY MORE.: NEVER TRUE

## 2025-04-15 SDOH — ECONOMIC STABILITY: INCOME INSECURITY: IN THE LAST 12 MONTHS, WAS THERE A TIME WHEN YOU WERE NOT ABLE TO PAY THE MORTGAGE OR RENT ON TIME?: NO

## 2025-04-15 SDOH — ECONOMIC STABILITY: FOOD INSECURITY: WITHIN THE PAST 12 MONTHS, THE FOOD YOU BOUGHT JUST DIDN'T LAST AND YOU DIDN'T HAVE MONEY TO GET MORE.: NEVER TRUE

## 2025-04-15 ASSESSMENT — PATIENT HEALTH QUESTIONNAIRE - PHQ9
1. LITTLE INTEREST OR PLEASURE IN DOING THINGS: NOT AT ALL
SUM OF ALL RESPONSES TO PHQ QUESTIONS 1-9: 2
8. MOVING OR SPEAKING SO SLOWLY THAT OTHER PEOPLE COULD HAVE NOTICED. OR THE OPPOSITE, BEING SO FIGETY OR RESTLESS THAT YOU HAVE BEEN MOVING AROUND A LOT MORE THAN USUAL: NOT AT ALL
SUM OF ALL RESPONSES TO PHQ QUESTIONS 1-9: 2
1. LITTLE INTEREST OR PLEASURE IN DOING THINGS: NOT AT ALL
4. FEELING TIRED OR HAVING LITTLE ENERGY: SEVERAL DAYS
SUM OF ALL RESPONSES TO PHQ QUESTIONS 1-9: 2
8. MOVING OR SPEAKING SO SLOWLY THAT OTHER PEOPLE COULD HAVE NOTICED. OR THE OPPOSITE - BEING SO FIDGETY OR RESTLESS THAT YOU HAVE BEEN MOVING AROUND A LOT MORE THAN USUAL: NOT AT ALL
9. THOUGHTS THAT YOU WOULD BE BETTER OFF DEAD, OR OF HURTING YOURSELF: NOT AT ALL
10. IF YOU CHECKED OFF ANY PROBLEMS, HOW DIFFICULT HAVE THESE PROBLEMS MADE IT FOR YOU TO DO YOUR WORK, TAKE CARE OF THINGS AT HOME, OR GET ALONG WITH OTHER PEOPLE: NOT DIFFICULT AT ALL
2. FEELING DOWN, DEPRESSED OR HOPELESS: NOT AT ALL
4. FEELING TIRED OR HAVING LITTLE ENERGY: SEVERAL DAYS
6. FEELING BAD ABOUT YOURSELF - OR THAT YOU ARE A FAILURE OR HAVE LET YOURSELF OR YOUR FAMILY DOWN: NOT AT ALL
7. TROUBLE CONCENTRATING ON THINGS, SUCH AS READING THE NEWSPAPER OR WATCHING TELEVISION: SEVERAL DAYS
5. POOR APPETITE OR OVEREATING: NOT AT ALL
10. IF YOU CHECKED OFF ANY PROBLEMS, HOW DIFFICULT HAVE THESE PROBLEMS MADE IT FOR YOU TO DO YOUR WORK, TAKE CARE OF THINGS AT HOME, OR GET ALONG WITH OTHER PEOPLE: NOT DIFFICULT AT ALL
7. TROUBLE CONCENTRATING ON THINGS, SUCH AS READING THE NEWSPAPER OR WATCHING TELEVISION: SEVERAL DAYS
SUM OF ALL RESPONSES TO PHQ QUESTIONS 1-9: 2
3. TROUBLE FALLING OR STAYING ASLEEP: NOT AT ALL
9. THOUGHTS THAT YOU WOULD BE BETTER OFF DEAD, OR OF HURTING YOURSELF: NOT AT ALL
3. TROUBLE FALLING OR STAYING ASLEEP: NOT AT ALL
2. FEELING DOWN, DEPRESSED OR HOPELESS: NOT AT ALL
5. POOR APPETITE OR OVEREATING: NOT AT ALL
6. FEELING BAD ABOUT YOURSELF - OR THAT YOU ARE A FAILURE OR HAVE LET YOURSELF OR YOUR FAMILY DOWN: NOT AT ALL
SUM OF ALL RESPONSES TO PHQ QUESTIONS 1-9: 2

## 2025-04-18 ENCOUNTER — OFFICE VISIT (OUTPATIENT)
Dept: PRIMARY CARE CLINIC | Age: 50
End: 2025-04-18

## 2025-04-18 VITALS
BODY MASS INDEX: 34.95 KG/M2 | HEART RATE: 72 BPM | SYSTOLIC BLOOD PRESSURE: 132 MMHG | HEIGHT: 72 IN | DIASTOLIC BLOOD PRESSURE: 94 MMHG | WEIGHT: 258 LBS | OXYGEN SATURATION: 98 %

## 2025-04-18 DIAGNOSIS — F41.9 ANXIETY AND DEPRESSION: ICD-10-CM

## 2025-04-18 DIAGNOSIS — Z79.899 MEDICATION MANAGEMENT: ICD-10-CM

## 2025-04-18 DIAGNOSIS — Z13.220 SCREENING CHOLESTEROL LEVEL: ICD-10-CM

## 2025-04-18 DIAGNOSIS — R03.0 BORDERLINE HYPERTENSION: Primary | ICD-10-CM

## 2025-04-18 DIAGNOSIS — Z13.1 DIABETES MELLITUS SCREENING: ICD-10-CM

## 2025-04-18 DIAGNOSIS — F32.A ANXIETY AND DEPRESSION: ICD-10-CM

## 2025-04-18 NOTE — PROGRESS NOTES
[facility] PHYSICIANS  [facility] PRIMARY CARE  [address] [address]  [place] [address]  Dept: [phone]    Kishore Day is a 49 y.o. male Established patient, who presents today for his medical conditions/complaintsas noted below.      Chief Complaint   Patient presents with    Hypertension       HPI:     HPI    Reviewed prior notes None  Reviewed previous Labs  Trying to watch diet.    No components found for: \"LDLCHOLESTEROL\", \"LDLCALC\"    (goal LDL is <100)   AST (U/L)   Date Value   08/22/2023 27     ALT (U/L)   Date Value   08/22/2023 35     BUN (mg/dL)   Date Value   08/22/2023 10     BP Readings from Last 3 Encounters:   04/18/25 (!) 132/94   10/18/24 (!) 130/98   12/27/23 116/78          (goal 120/80)    Past Medical History:   Diagnosis Date    Anxiety     Depression     Left flank pain     Sleep apnea 4/1/2023    Ureteral calculus, left     Wellness examination 07/02/2021    Dr. Lili Alonso  [facility]      Past Surgical History:   Procedure Laterality Date    COLONOSCOPY  12/09/2022    COLONOSCOPY N/A 12/9/2022    COLONOSCOPY WITH DESCENDING COLON POLYPECTOMIES performed by Dudley Linares DO at [facility] OR    URETER SURGERY Left 08/27/2021    HOLMIUM, CYSTOSCOPY, URETEROSCOPY, STENT PLACEMENT performed by Tristan Szymanski MD at [facility] OR    URETEROSCOPY Left 08/27/2021    stent placement. cystoscopy     WISDOM TOOTH EXTRACTION         Family History   Problem Relation Age of Onset    High Blood Pressure Mother     Unknown Father        Social History     Tobacco Use    Smoking status: Never    Smokeless tobacco: Never   Substance Use Topics    Alcohol use: Never      Current Outpatient Medications   Medication Sig Dispense Refill    citalopram (CELEXA) 40 MG tablet TAKE 1 TABLET DAILY 90 tablet 3     No current facility-administered medications for this visit.     Allergies   Allergen Reactions    Bactrim [Sulfamethoxazole-Trimethoprim] Itching and Swelling       Health

## 2025-08-14 PROBLEM — J01.90 ACUTE SINUSITIS: Status: RESOLVED | Noted: 2023-12-27 | Resolved: 2025-08-14

## 2025-08-22 ENCOUNTER — OFFICE VISIT (OUTPATIENT)
Dept: PRIMARY CARE CLINIC | Age: 50
End: 2025-08-22
Payer: COMMERCIAL

## 2025-08-22 VITALS
SYSTOLIC BLOOD PRESSURE: 112 MMHG | WEIGHT: 257 LBS | OXYGEN SATURATION: 98 % | HEIGHT: 72 IN | BODY MASS INDEX: 34.81 KG/M2 | HEART RATE: 68 BPM | DIASTOLIC BLOOD PRESSURE: 82 MMHG

## 2025-08-22 DIAGNOSIS — R22.31 LUMP OF SKIN OF RIGHT UPPER EXTREMITY: ICD-10-CM

## 2025-08-22 DIAGNOSIS — Z76.89 ENCOUNTER TO ESTABLISH CARE: Primary | ICD-10-CM

## 2025-08-22 DIAGNOSIS — Z13.220 ENCOUNTER FOR LIPID SCREENING FOR CARDIOVASCULAR DISEASE: ICD-10-CM

## 2025-08-22 DIAGNOSIS — R03.0 BORDERLINE HYPERTENSION: ICD-10-CM

## 2025-08-22 DIAGNOSIS — Z13.6 ENCOUNTER FOR LIPID SCREENING FOR CARDIOVASCULAR DISEASE: ICD-10-CM

## 2025-08-22 DIAGNOSIS — F32.A ANXIETY AND DEPRESSION: ICD-10-CM

## 2025-08-22 DIAGNOSIS — F41.9 ANXIETY AND DEPRESSION: ICD-10-CM

## 2025-08-22 PROBLEM — G47.33 OBSTRUCTIVE SLEEP APNEA (ADULT) (PEDIATRIC): Status: ACTIVE | Noted: 2025-08-22

## 2025-08-22 PROBLEM — Z79.899 MEDICATION MANAGEMENT: Status: RESOLVED | Noted: 2023-02-17 | Resolved: 2025-08-22

## 2025-08-22 LAB
ALBUMIN/GLOBULIN RATIO: 1.8 (ref 1–2.5)
ALBUMIN: 4.6 G/DL (ref 3.5–5.2)
ALP BLD-CCNC: 68 U/L (ref 40–129)
ALT SERPL-CCNC: 32 U/L (ref 10–50)
ANION GAP SERPL CALCULATED.3IONS-SCNC: 11 MMOL/L (ref 9–16)
AST SERPL-CCNC: 27 U/L (ref 10–50)
BILIRUB SERPL-MCNC: 0.4 MG/DL (ref 0–1.2)
BUN BLDV-MCNC: 11 MG/DL (ref 6–20)
CALCIUM SERPL-MCNC: 9.4 MG/DL (ref 8.6–10.4)
CHLORIDE BLD-SCNC: 107 MMOL/L (ref 98–107)
CHOLESTEROL, FASTING: 218 MG/DL (ref 0–199)
CHOLESTEROL/HDL RATIO: 5.2
CO2: 25 MMOL/L (ref 20–31)
CREAT SERPL-MCNC: 1.2 MG/DL (ref 0.7–1.2)
GFR, ESTIMATED: 73 ML/MIN/1.73M2
GLUCOSE BLD-MCNC: 94 MG/DL (ref 74–99)
HDLC SERPL-MCNC: 42 MG/DL
LDL CHOLESTEROL: 141 MG/DL (ref 0–100)
POTASSIUM SERPL-SCNC: 4.3 MMOL/L (ref 3.7–5.3)
SODIUM BLD-SCNC: 143 MMOL/L (ref 136–145)
TOTAL PROTEIN: 7.1 G/DL (ref 6.6–8.7)
TRIGLYCERIDE, FASTING: 173 MG/DL (ref 0–149)
VLDLC SERPL CALC-MCNC: 35 MG/DL (ref 1–30)

## 2025-08-22 PROCEDURE — 99214 OFFICE O/P EST MOD 30 MIN: CPT | Performed by: PHYSICIAN ASSISTANT

## 2025-08-22 RX ORDER — CITALOPRAM HYDROBROMIDE 40 MG/1
40 TABLET ORAL DAILY
Qty: 90 TABLET | Refills: 3 | Status: SHIPPED | OUTPATIENT
Start: 2025-08-22

## 2025-08-22 ASSESSMENT — PATIENT HEALTH QUESTIONNAIRE - PHQ9
1. LITTLE INTEREST OR PLEASURE IN DOING THINGS: NOT AT ALL
4. FEELING TIRED OR HAVING LITTLE ENERGY: NOT AT ALL
3. TROUBLE FALLING OR STAYING ASLEEP: NOT AT ALL
6. FEELING BAD ABOUT YOURSELF - OR THAT YOU ARE A FAILURE OR HAVE LET YOURSELF OR YOUR FAMILY DOWN: NOT AT ALL
SUM OF ALL RESPONSES TO PHQ QUESTIONS 1-9: 0
5. POOR APPETITE OR OVEREATING: NOT AT ALL
2. FEELING DOWN, DEPRESSED OR HOPELESS: NOT AT ALL
10. IF YOU CHECKED OFF ANY PROBLEMS, HOW DIFFICULT HAVE THESE PROBLEMS MADE IT FOR YOU TO DO YOUR WORK, TAKE CARE OF THINGS AT HOME, OR GET ALONG WITH OTHER PEOPLE: NOT DIFFICULT AT ALL
7. TROUBLE CONCENTRATING ON THINGS, SUCH AS READING THE NEWSPAPER OR WATCHING TELEVISION: NOT AT ALL
SUM OF ALL RESPONSES TO PHQ QUESTIONS 1-9: 0
8. MOVING OR SPEAKING SO SLOWLY THAT OTHER PEOPLE COULD HAVE NOTICED. OR THE OPPOSITE, BEING SO FIGETY OR RESTLESS THAT YOU HAVE BEEN MOVING AROUND A LOT MORE THAN USUAL: NOT AT ALL
SUM OF ALL RESPONSES TO PHQ QUESTIONS 1-9: 0
9. THOUGHTS THAT YOU WOULD BE BETTER OFF DEAD, OR OF HURTING YOURSELF: NOT AT ALL
SUM OF ALL RESPONSES TO PHQ QUESTIONS 1-9: 0

## 2025-08-24 ENCOUNTER — RESULTS FOLLOW-UP (OUTPATIENT)
Dept: PRIMARY CARE CLINIC | Age: 50
End: 2025-08-24

## (undated) DEVICE — ADAPTER URO SCP UROLOK LL

## (undated) DEVICE — FORCEPS BX L240CM JAW DIA2.4MM ORNG L CAP W/ NDL DISP RAD

## (undated) DEVICE — DUAL LUMEN URETERAL CATHETER

## (undated) DEVICE — GARMENT,MEDLINE,DVT,INT,CALF,MED, GEN2: Brand: MEDLINE

## (undated) DEVICE — 4-PORT MANIFOLD: Brand: NEPTUNE 2

## (undated) DEVICE — CONNECTOR AUXILIARY WATER JET SING USE

## (undated) DEVICE — POLYP TRAP: Brand: TRAPEASE®

## (undated) DEVICE — DRAPE,REIN 53X77,STERILE: Brand: MEDLINE

## (undated) DEVICE — Device: Brand: DEFENDO VALVE AND CONNECTOR KIT

## (undated) DEVICE — GUIDEWIRE URO L150CM DIA0.035IN STIFF NIT HYDRPHLC STR TIP

## (undated) DEVICE — PERRYSBURG ENDO PACK: Brand: MEDLINE INDUSTRIES, INC.

## (undated) DEVICE — 3M™ STERI-STRIP™ COMPOUND BENZOIN TINCTURE 40 BAGS/CARTON 4 CARTONS/CASE C1544: Brand: 3M™ STERI-STRIP™

## (undated) DEVICE — SNARE ENDOSCP L240CM SHTH DIA2.4MM LOOP W20MM MIN WRK CHN

## (undated) DEVICE — SINGLE ACTION PUMPING SYSTEM

## (undated) DEVICE — STRIP,CLOSURE,WOUND,MEDI-STRIP,1/2X4: Brand: MEDLINE

## (undated) DEVICE — SOLUTION SCRB 4OZ 4% CHG H2O AIDED FOR PREOPERATIVE SKIN

## (undated) DEVICE — FIBER LASER HOLM DISP SU200RT] LEONI FIBER OPTICS INC]

## (undated) DEVICE — GLOVE ORANGE PI 7   MSG9070

## (undated) DEVICE — GOWN,SIRUS,NONRNF,SETINSLV,XL,20/CS: Brand: MEDLINE

## (undated) DEVICE — ERBE NESSY®PLATE 170 SPLIT; 168CM²; CABLE 3M: Brand: ERBE

## (undated) DEVICE — PACK PROCEDURE SURG CYSTO SVMMC LF